# Patient Record
Sex: MALE | Race: WHITE | Employment: OTHER | URBAN - METROPOLITAN AREA
[De-identification: names, ages, dates, MRNs, and addresses within clinical notes are randomized per-mention and may not be internally consistent; named-entity substitution may affect disease eponyms.]

---

## 2017-01-19 ENCOUNTER — TELEPHONE (OUTPATIENT)
Dept: FAMILY MEDICINE CLINIC | Age: 72
End: 2017-01-19

## 2017-07-31 RX ORDER — MINOXIDIL 2.5 MG/1
2.5 TABLET ORAL DAILY
Qty: 90 TABLET | Refills: 3 | Status: SHIPPED | OUTPATIENT
Start: 2017-07-31 | End: 2018-09-04 | Stop reason: SDUPTHER

## 2017-07-31 RX ORDER — LABETALOL 200 MG/1
200 TABLET, FILM COATED ORAL DAILY
Qty: 90 TABLET | Refills: 3 | Status: SHIPPED | OUTPATIENT
Start: 2017-07-31 | End: 2018-09-04 | Stop reason: SDUPTHER

## 2017-07-31 RX ORDER — AMLODIPINE BESYLATE 5 MG/1
5 TABLET ORAL DAILY
Qty: 90 TABLET | Refills: 3 | Status: SHIPPED | OUTPATIENT
Start: 2017-07-31 | End: 2018-09-04 | Stop reason: SDUPTHER

## 2017-07-31 RX ORDER — ALLOPURINOL 300 MG/1
300 TABLET ORAL DAILY
Qty: 90 TABLET | Refills: 3 | Status: SHIPPED | OUTPATIENT
Start: 2017-07-31 | End: 2018-09-04

## 2017-07-31 RX ORDER — GLIMEPIRIDE 2 MG/1
2 TABLET ORAL DAILY
Qty: 90 TABLET | Refills: 3 | Status: SHIPPED | OUTPATIENT
Start: 2017-07-31 | End: 2018-09-04 | Stop reason: SDUPTHER

## 2017-07-31 RX ORDER — LISINOPRIL 10 MG/1
10 TABLET ORAL DAILY
Qty: 90 TABLET | Refills: 3 | Status: SHIPPED | OUTPATIENT
Start: 2017-07-31 | End: 2018-09-04 | Stop reason: SDUPTHER

## 2017-08-17 ENCOUNTER — OFFICE VISIT (OUTPATIENT)
Dept: FAMILY MEDICINE CLINIC | Age: 72
End: 2017-08-17

## 2017-08-17 VITALS
SYSTOLIC BLOOD PRESSURE: 160 MMHG | RESPIRATION RATE: 22 BRPM | OXYGEN SATURATION: 96 % | BODY MASS INDEX: 49.44 KG/M2 | DIASTOLIC BLOOD PRESSURE: 80 MMHG | HEIGHT: 67 IN | HEART RATE: 88 BPM | TEMPERATURE: 98.4 F | WEIGHT: 315 LBS

## 2017-08-17 DIAGNOSIS — M75.52 BURSITIS OF LEFT SHOULDER: ICD-10-CM

## 2017-08-17 DIAGNOSIS — Z23 NEED FOR PNEUMOCOCCAL VACCINE: ICD-10-CM

## 2017-08-17 DIAGNOSIS — I10 ESSENTIAL HYPERTENSION: Primary | ICD-10-CM

## 2017-08-17 DIAGNOSIS — Z12.11 COLON CANCER SCREENING: ICD-10-CM

## 2017-08-17 DIAGNOSIS — E66.01 MORBID OBESITY WITH BMI OF 50.0-59.9, ADULT (HCC): ICD-10-CM

## 2017-08-17 DIAGNOSIS — I63.9 CEREBROVASCULAR ACCIDENT (CVA), UNSPECIFIED MECHANISM (HCC): ICD-10-CM

## 2017-08-17 PROCEDURE — 99214 OFFICE O/P EST MOD 30 MIN: CPT | Performed by: FAMILY MEDICINE

## 2017-08-17 PROCEDURE — 90732 PPSV23 VACC 2 YRS+ SUBQ/IM: CPT | Performed by: FAMILY MEDICINE

## 2017-08-17 PROCEDURE — G8427 DOCREV CUR MEDS BY ELIG CLIN: HCPCS | Performed by: FAMILY MEDICINE

## 2017-08-17 PROCEDURE — G0009 ADMIN PNEUMOCOCCAL VACCINE: HCPCS | Performed by: FAMILY MEDICINE

## 2017-08-17 PROCEDURE — 4004F PT TOBACCO SCREEN RCVD TLK: CPT | Performed by: FAMILY MEDICINE

## 2017-08-17 PROCEDURE — G8598 ASA/ANTIPLAT THER USED: HCPCS | Performed by: FAMILY MEDICINE

## 2017-08-17 PROCEDURE — 4040F PNEUMOC VAC/ADMIN/RCVD: CPT | Performed by: FAMILY MEDICINE

## 2017-08-17 PROCEDURE — 3017F COLORECTAL CA SCREEN DOC REV: CPT | Performed by: FAMILY MEDICINE

## 2017-08-17 PROCEDURE — 1123F ACP DISCUSS/DSCN MKR DOCD: CPT | Performed by: FAMILY MEDICINE

## 2017-08-17 PROCEDURE — G8417 CALC BMI ABV UP PARAM F/U: HCPCS | Performed by: FAMILY MEDICINE

## 2017-08-17 RX ORDER — ATORVASTATIN CALCIUM 40 MG/1
40 TABLET, FILM COATED ORAL DAILY
Qty: 90 TABLET | Refills: 3 | Status: SHIPPED | OUTPATIENT
Start: 2017-08-17 | End: 2018-09-04

## 2017-08-17 ASSESSMENT — ENCOUNTER SYMPTOMS
SHORTNESS OF BREATH: 0
GASTROINTESTINAL NEGATIVE: 1
ALLERGIC/IMMUNOLOGIC NEGATIVE: 1
RESPIRATORY NEGATIVE: 1
EYES NEGATIVE: 1

## 2017-08-22 ENCOUNTER — HOSPITAL ENCOUNTER (OUTPATIENT)
Dept: PHYSICAL THERAPY | Age: 72
Setting detail: THERAPIES SERIES
Discharge: HOME | End: 2017-08-22
Payer: MEDICARE

## 2017-08-22 PROCEDURE — 97110 THERAPEUTIC EXERCISES: CPT

## 2017-08-22 PROCEDURE — G8984 CARRY CURRENT STATUS: HCPCS

## 2017-08-22 PROCEDURE — G8985 CARRY GOAL STATUS: HCPCS

## 2017-08-22 PROCEDURE — 97162 PT EVAL MOD COMPLEX 30 MIN: CPT

## 2017-08-22 ASSESSMENT — PAIN DESCRIPTION - PAIN TYPE: TYPE: ACUTE PAIN

## 2017-08-22 ASSESSMENT — PAIN DESCRIPTION - DESCRIPTORS: DESCRIPTORS: SHARP

## 2017-08-22 ASSESSMENT — PAIN DESCRIPTION - ORIENTATION: ORIENTATION: LEFT;ANTERIOR

## 2017-08-22 ASSESSMENT — PAIN DESCRIPTION - LOCATION: LOCATION: SHOULDER

## 2017-08-22 ASSESSMENT — PAIN DESCRIPTION - PROGRESSION: CLINICAL_PROGRESSION: GRADUALLY IMPROVING

## 2017-08-23 ENCOUNTER — TELEPHONE (OUTPATIENT)
Dept: FAMILY MEDICINE CLINIC | Age: 72
End: 2017-08-23

## 2017-08-23 DIAGNOSIS — R27.0 ATAXIA: Primary | ICD-10-CM

## 2017-08-25 ENCOUNTER — HOSPITAL ENCOUNTER (OUTPATIENT)
Dept: PHYSICAL THERAPY | Age: 72
Setting detail: THERAPIES SERIES
Discharge: HOME | End: 2017-08-25
Payer: MEDICARE

## 2017-08-25 PROCEDURE — 97110 THERAPEUTIC EXERCISES: CPT

## 2017-08-30 ENCOUNTER — HOSPITAL ENCOUNTER (OUTPATIENT)
Dept: PHYSICAL THERAPY | Age: 72
Setting detail: THERAPIES SERIES
Discharge: HOME | End: 2017-08-30
Payer: MEDICARE

## 2017-08-30 PROCEDURE — 97110 THERAPEUTIC EXERCISES: CPT

## 2017-08-30 PROCEDURE — 97164 PT RE-EVAL EST PLAN CARE: CPT

## 2017-08-30 ASSESSMENT — PAIN SCALES - GENERAL: PAINLEVEL_OUTOF10: 4

## 2017-08-30 ASSESSMENT — PAIN DESCRIPTION - ORIENTATION: ORIENTATION: LEFT;ANTERIOR

## 2017-08-30 ASSESSMENT — PAIN DESCRIPTION - DESCRIPTORS: DESCRIPTORS: ACHING

## 2017-08-30 ASSESSMENT — PAIN DESCRIPTION - PAIN TYPE: TYPE: ACUTE PAIN

## 2017-08-30 ASSESSMENT — PAIN DESCRIPTION - LOCATION: LOCATION: SHOULDER

## 2017-09-01 ENCOUNTER — APPOINTMENT (OUTPATIENT)
Dept: PHYSICAL THERAPY | Age: 72
End: 2017-09-01
Payer: MEDICARE

## 2017-09-05 ENCOUNTER — HOSPITAL ENCOUNTER (OUTPATIENT)
Dept: PHYSICAL THERAPY | Age: 72
Setting detail: THERAPIES SERIES
Discharge: HOME | End: 2017-09-05
Payer: MEDICARE

## 2017-09-05 ENCOUNTER — APPOINTMENT (OUTPATIENT)
Dept: PHYSICAL THERAPY | Age: 72
End: 2017-09-05
Payer: MEDICARE

## 2017-09-05 PROCEDURE — 97112 NEUROMUSCULAR REEDUCATION: CPT

## 2017-09-05 PROCEDURE — 97110 THERAPEUTIC EXERCISES: CPT

## 2017-09-05 ASSESSMENT — PAIN SCALES - GENERAL: PAINLEVEL_OUTOF10: 5

## 2017-09-05 ASSESSMENT — PAIN DESCRIPTION - LOCATION: LOCATION: SHOULDER

## 2017-09-05 ASSESSMENT — PAIN DESCRIPTION - PROGRESSION: CLINICAL_PROGRESSION: GRADUALLY IMPROVING

## 2017-09-08 ENCOUNTER — APPOINTMENT (OUTPATIENT)
Dept: PHYSICAL THERAPY | Age: 72
End: 2017-09-08
Payer: MEDICARE

## 2017-09-11 ENCOUNTER — HOSPITAL ENCOUNTER (OUTPATIENT)
Dept: PHYSICAL THERAPY | Age: 72
Setting detail: THERAPIES SERIES
Discharge: HOME | End: 2017-09-11
Payer: MEDICARE

## 2017-09-11 PROCEDURE — 97110 THERAPEUTIC EXERCISES: CPT

## 2017-09-11 PROCEDURE — 97112 NEUROMUSCULAR REEDUCATION: CPT

## 2017-09-11 ASSESSMENT — PAIN DESCRIPTION - ORIENTATION: ORIENTATION: LEFT

## 2017-09-11 ASSESSMENT — PAIN SCALES - GENERAL: PAINLEVEL_OUTOF10: 4

## 2017-09-11 ASSESSMENT — PAIN DESCRIPTION - PAIN TYPE: TYPE: ACUTE PAIN

## 2017-09-11 ASSESSMENT — PAIN DESCRIPTION - LOCATION: LOCATION: SHOULDER

## 2017-09-11 ASSESSMENT — PAIN DESCRIPTION - PROGRESSION: CLINICAL_PROGRESSION: GRADUALLY IMPROVING

## 2017-09-11 ASSESSMENT — PAIN DESCRIPTION - DESCRIPTORS: DESCRIPTORS: ACHING

## 2017-09-13 ENCOUNTER — HOSPITAL ENCOUNTER (OUTPATIENT)
Dept: PHYSICAL THERAPY | Age: 72
Setting detail: THERAPIES SERIES
Discharge: HOME | End: 2017-09-13
Payer: MEDICARE

## 2017-09-13 PROCEDURE — 97112 NEUROMUSCULAR REEDUCATION: CPT

## 2017-09-13 PROCEDURE — 97110 THERAPEUTIC EXERCISES: CPT

## 2017-09-13 ASSESSMENT — PAIN DESCRIPTION - LOCATION: LOCATION: SHOULDER

## 2017-09-13 ASSESSMENT — PAIN DESCRIPTION - ORIENTATION: ORIENTATION: LEFT

## 2017-09-13 ASSESSMENT — PAIN SCALES - GENERAL: PAINLEVEL_OUTOF10: 3

## 2017-09-13 ASSESSMENT — PAIN DESCRIPTION - PROGRESSION: CLINICAL_PROGRESSION: GRADUALLY IMPROVING

## 2017-09-15 ENCOUNTER — HOSPITAL ENCOUNTER (OUTPATIENT)
Dept: PHYSICAL THERAPY | Age: 72
Setting detail: THERAPIES SERIES
Discharge: HOME | End: 2017-09-15
Payer: MEDICARE

## 2017-09-15 PROCEDURE — G8986 CARRY D/C STATUS: HCPCS

## 2017-09-15 PROCEDURE — 97116 GAIT TRAINING THERAPY: CPT

## 2017-09-15 PROCEDURE — 97110 THERAPEUTIC EXERCISES: CPT

## 2017-09-15 PROCEDURE — G8985 CARRY GOAL STATUS: HCPCS

## 2017-09-15 PROCEDURE — 97112 NEUROMUSCULAR REEDUCATION: CPT

## 2017-09-15 ASSESSMENT — PAIN DESCRIPTION - LOCATION: LOCATION: SHOULDER

## 2017-09-15 ASSESSMENT — PAIN DESCRIPTION - ORIENTATION: ORIENTATION: LEFT

## 2017-09-15 ASSESSMENT — PAIN SCALES - GENERAL: PAINLEVEL_OUTOF10: 3

## 2017-12-22 ENCOUNTER — TELEPHONE (OUTPATIENT)
Dept: FAMILY MEDICINE CLINIC | Age: 72
End: 2017-12-22

## 2018-09-04 ENCOUNTER — OFFICE VISIT (OUTPATIENT)
Dept: INTERNAL MEDICINE CLINIC | Age: 73
End: 2018-09-04
Payer: MEDICARE

## 2018-09-04 VITALS
BODY MASS INDEX: 46.65 KG/M2 | TEMPERATURE: 98.2 F | SYSTOLIC BLOOD PRESSURE: 170 MMHG | RESPIRATION RATE: 17 BRPM | HEIGHT: 69 IN | DIASTOLIC BLOOD PRESSURE: 96 MMHG | OXYGEN SATURATION: 97 % | HEART RATE: 89 BPM | WEIGHT: 315 LBS

## 2018-09-04 DIAGNOSIS — E66.01 MORBID OBESITY WITH BMI OF 50.0-59.9, ADULT (HCC): ICD-10-CM

## 2018-09-04 DIAGNOSIS — E78.5 HYPERLIPIDEMIA, UNSPECIFIED HYPERLIPIDEMIA TYPE: ICD-10-CM

## 2018-09-04 DIAGNOSIS — I63.9 CEREBROVASCULAR ACCIDENT (CVA), UNSPECIFIED MECHANISM (HCC): ICD-10-CM

## 2018-09-04 DIAGNOSIS — E11.8 TYPE 2 DIABETES MELLITUS WITH COMPLICATION, WITHOUT LONG-TERM CURRENT USE OF INSULIN (HCC): ICD-10-CM

## 2018-09-04 DIAGNOSIS — Z12.5 PROSTATE CANCER SCREENING: ICD-10-CM

## 2018-09-04 DIAGNOSIS — I10 ESSENTIAL HYPERTENSION: Primary | ICD-10-CM

## 2018-09-04 PROCEDURE — 2022F DILAT RTA XM EVC RTNOPTHY: CPT | Performed by: FAMILY MEDICINE

## 2018-09-04 PROCEDURE — 1123F ACP DISCUSS/DSCN MKR DOCD: CPT | Performed by: FAMILY MEDICINE

## 2018-09-04 PROCEDURE — G8598 ASA/ANTIPLAT THER USED: HCPCS | Performed by: FAMILY MEDICINE

## 2018-09-04 PROCEDURE — 1036F TOBACCO NON-USER: CPT | Performed by: FAMILY MEDICINE

## 2018-09-04 PROCEDURE — 1101F PT FALLS ASSESS-DOCD LE1/YR: CPT | Performed by: FAMILY MEDICINE

## 2018-09-04 PROCEDURE — 99214 OFFICE O/P EST MOD 30 MIN: CPT | Performed by: FAMILY MEDICINE

## 2018-09-04 PROCEDURE — G8510 SCR DEP NEG, NO PLAN REQD: HCPCS | Performed by: FAMILY MEDICINE

## 2018-09-04 PROCEDURE — 4040F PNEUMOC VAC/ADMIN/RCVD: CPT | Performed by: FAMILY MEDICINE

## 2018-09-04 PROCEDURE — G8427 DOCREV CUR MEDS BY ELIG CLIN: HCPCS | Performed by: FAMILY MEDICINE

## 2018-09-04 PROCEDURE — 3046F HEMOGLOBIN A1C LEVEL >9.0%: CPT | Performed by: FAMILY MEDICINE

## 2018-09-04 PROCEDURE — 3017F COLORECTAL CA SCREEN DOC REV: CPT | Performed by: FAMILY MEDICINE

## 2018-09-04 PROCEDURE — G8417 CALC BMI ABV UP PARAM F/U: HCPCS | Performed by: FAMILY MEDICINE

## 2018-09-04 RX ORDER — TETRACYCLINE HYDROCHLORIDE 500 MG/1
500 CAPSULE ORAL 4 TIMES DAILY
COMMUNITY
End: 2019-09-06 | Stop reason: ALTCHOICE

## 2018-09-04 RX ORDER — LABETALOL 200 MG/1
200 TABLET, FILM COATED ORAL DAILY
Qty: 90 TABLET | Refills: 3 | Status: SHIPPED | OUTPATIENT
Start: 2018-09-04 | End: 2019-09-06 | Stop reason: SDUPTHER

## 2018-09-04 RX ORDER — LISINOPRIL 10 MG/1
10 TABLET ORAL DAILY
Qty: 90 TABLET | Refills: 3 | Status: SHIPPED | OUTPATIENT
Start: 2018-09-04 | End: 2019-09-06 | Stop reason: SDUPTHER

## 2018-09-04 RX ORDER — MINOXIDIL 2.5 MG/1
2.5 TABLET ORAL DAILY
Qty: 90 TABLET | Refills: 3 | Status: SHIPPED | OUTPATIENT
Start: 2018-09-04 | End: 2019-09-06 | Stop reason: SDUPTHER

## 2018-09-04 RX ORDER — GLIMEPIRIDE 2 MG/1
2 TABLET ORAL DAILY
Qty: 90 TABLET | Refills: 3 | Status: SHIPPED | OUTPATIENT
Start: 2018-09-04 | End: 2019-09-06 | Stop reason: SDUPTHER

## 2018-09-04 RX ORDER — AMLODIPINE BESYLATE 5 MG/1
5 TABLET ORAL DAILY
Qty: 90 TABLET | Refills: 3 | Status: SHIPPED | OUTPATIENT
Start: 2018-09-04 | End: 2019-09-06 | Stop reason: SDUPTHER

## 2018-09-04 RX ORDER — LISINOPRIL 10 MG/1
TABLET ORAL
COMMUNITY
End: 2018-09-04 | Stop reason: SDUPTHER

## 2018-09-04 RX ORDER — MOMETASONE FUROATE 1 MG/G
CREAM TOPICAL
Qty: 45 G | Refills: 2 | Status: SHIPPED | OUTPATIENT
Start: 2018-09-04 | End: 2019-09-06 | Stop reason: ALTCHOICE

## 2018-09-04 ASSESSMENT — PATIENT HEALTH QUESTIONNAIRE - PHQ9
2. FEELING DOWN, DEPRESSED OR HOPELESS: 0
SUM OF ALL RESPONSES TO PHQ9 QUESTIONS 1 & 2: 0
1. LITTLE INTEREST OR PLEASURE IN DOING THINGS: 0
SUM OF ALL RESPONSES TO PHQ QUESTIONS 1-9: 0
SUM OF ALL RESPONSES TO PHQ QUESTIONS 1-9: 0

## 2018-09-04 ASSESSMENT — ENCOUNTER SYMPTOMS
GASTROINTESTINAL NEGATIVE: 1
RESPIRATORY NEGATIVE: 1
ALLERGIC/IMMUNOLOGIC NEGATIVE: 1
SHORTNESS OF BREATH: 0
EYES NEGATIVE: 1

## 2018-09-04 NOTE — PROGRESS NOTES
Patient is seen in follow up for   Chief Complaint   Patient presents with    Hypertension     Patient presents here for a HTN yearly check up.  Health Maintenance     Patient declines HM. Hypertension   This is a chronic problem. The current episode started more than 1 year ago. The problem is unchanged. The problem is controlled. Pertinent negatives include no chest pain, palpitations or shortness of breath. There are no associated agents to hypertension. Risk factors for coronary artery disease include diabetes mellitus and obesity. Past treatments include ACE inhibitors and calcium channel blockers. The current treatment provides moderate improvement. There are no compliance problems. Past Medical History:   Diagnosis Date    [facility]A (cerebral vascular accident) ([facility] [address]     Diverticulitis     Hyperlipidemia     Hypertension      Patient Active Problem List    Diagnosis Date Noted    Hypertension     Hyperlipidemia     Diverticulitis     CVA (cerebral vascular accident) ([facility] [address]      No past surgical history on file.   Family History   Problem Relation Age of Onset    Heart Disease Mother     Heart Disease Father     Diabetes Sister     High Blood Pressure Sister      Social History     Social History    Marital status:      Spouse name: N/A    Number of children: N/A    Years of education: N/A     Social History Main Topics    Smoking status: Former Smoker     Packs/day: 0.20     Years: 5.00     Types: Cigars     Quit date: 1/4/2018    Smokeless tobacco: Never Used    Alcohol use Yes      Comment: social    Drug use: No    Sexual activity: Not Asked     Other Topics Concern    None     Social History Narrative    None     Current Outpatient Prescriptions   Medication Sig Dispense Refill    tetracycline (ACHROMYCIN;SUMYCIN) 500 MG capsule Take 500 mg by mouth 4 times daily      minoxidil (LONITEN) 2.5 MG tablet Take 1 tablet by mouth daily 90 tablet 3    lisinopril (ZESTRIL) (NORVASC) 5 MG tablet    aspirin 81 MG tablet          Plan  Orders Placed This Encounter   Procedures    CBC Auto Differential     Standing Status:   Future     Standing Expiration Date:   9/4/2019    Comprehensive Metabolic Panel     Standing Status:   Future     Standing Expiration Date:   9/4/2019    Lipid Panel     Standing Status:   Future     Standing Expiration Date:   9/4/2019     Order Specific Question:   Is Patient Fasting?/# of Hours     Answer:   8    Psa screening     Standing Status:   Future     Standing Expiration Date:   9/4/2019    Microalbumin / Creatinine Urine Ratio     Standing Status:   Future     Standing Expiration Date:   9/4/2019     Orders Placed This Encounter   Medications    minoxidil (LONITEN) 2.5 MG tablet     Sig: Take 1 tablet by mouth daily     Dispense:  90 tablet     Refill:  3    lisinopril (ZESTRIL) 10 MG tablet     Sig: Take 1 tablet by mouth daily     Dispense:  90 tablet     Refill:  3    labetalol (NORMODYNE) 200 MG tablet     Sig: Take 1 tablet by mouth daily     Dispense:  90 tablet     Refill:  3    glimepiride (AMARYL) 2 MG tablet     Sig: Take 1 tablet by mouth daily     Dispense:  90 tablet     Refill:  3    amLODIPine (NORVASC) 5 MG tablet     Sig: Take 1 tablet by mouth daily     Dispense:  90 tablet     Refill:  3    aspirin 81 MG tablet     Sig: Take 1 tablet by mouth daily     Dispense:  30 tablet     Refill:  0    mometasone (ELOCON) 0.1 % cream     Sig: Apply topically daily. Dispense:  45 g     Refill:  2     No Follow-up on file.   Kat Meyers MD

## 2018-09-05 RX ORDER — ALLOPURINOL 300 MG/1
300 TABLET ORAL DAILY
Qty: 90 TABLET | Refills: 3 | Status: SHIPPED | OUTPATIENT
Start: 2018-09-05 | End: 2019-09-06 | Stop reason: SDUPTHER

## 2018-09-13 DIAGNOSIS — I10 ESSENTIAL HYPERTENSION: ICD-10-CM

## 2018-09-13 DIAGNOSIS — Z12.5 PROSTATE CANCER SCREENING: ICD-10-CM

## 2018-09-13 DIAGNOSIS — E11.8 TYPE 2 DIABETES MELLITUS WITH COMPLICATION, WITHOUT LONG-TERM CURRENT USE OF INSULIN (HCC): ICD-10-CM

## 2018-09-13 LAB
ALBUMIN SERPL-MCNC: 4 G/DL (ref 3.9–4.9)
ALP BLD-CCNC: 96 U/L (ref 35–104)
ALT SERPL-CCNC: 12 U/L (ref 0–41)
ANION GAP SERPL CALCULATED.3IONS-SCNC: 14 MEQ/L (ref 7–13)
AST SERPL-CCNC: 14 U/L (ref 0–40)
BASOPHILS ABSOLUTE: 0.1 K/UL (ref 0–0.2)
BASOPHILS RELATIVE PERCENT: 0.8 %
BILIRUB SERPL-MCNC: 0.3 MG/DL (ref 0–1.2)
BUN BLDV-MCNC: 33 MG/DL (ref 8–23)
CALCIUM SERPL-MCNC: 9.7 MG/DL (ref 8.6–10.2)
CHLORIDE BLD-SCNC: 107 MEQ/L (ref 98–107)
CHOLESTEROL, TOTAL: 159 MG/DL (ref 0–199)
CO2: 21 MEQ/L (ref 22–29)
CREAT SERPL-MCNC: 1.54 MG/DL (ref 0.7–1.2)
CREATININE URINE: 60.5 MG/DL
EOSINOPHILS ABSOLUTE: 0.1 K/UL (ref 0–0.7)
EOSINOPHILS RELATIVE PERCENT: 1.6 %
GFR AFRICAN AMERICAN: 53.9
GFR NON-AFRICAN AMERICAN: 44.6
GLOBULIN: 3 G/DL (ref 2.3–3.5)
GLUCOSE BLD-MCNC: 131 MG/DL (ref 74–109)
HCT VFR BLD CALC: 35.3 % (ref 42–52)
HDLC SERPL-MCNC: 28 MG/DL (ref 40–59)
HEMOGLOBIN: 11.5 G/DL (ref 14–18)
LDL CHOLESTEROL CALCULATED: 103 MG/DL (ref 0–129)
LYMPHOCYTES ABSOLUTE: 1 K/UL (ref 1–4.8)
LYMPHOCYTES RELATIVE PERCENT: 13.3 %
MCH RBC QN AUTO: 28.9 PG (ref 27–31.3)
MCHC RBC AUTO-ENTMCNC: 32.7 % (ref 33–37)
MCV RBC AUTO: 88.6 FL (ref 80–100)
MICROALBUMIN UR-MCNC: 181.7 MG/DL
MICROALBUMIN/CREAT UR-RTO: 3003.3 MG/G (ref 0–30)
MONOCYTES ABSOLUTE: 0.4 K/UL (ref 0.2–0.8)
MONOCYTES RELATIVE PERCENT: 5.6 %
NEUTROPHILS ABSOLUTE: 5.8 K/UL (ref 1.4–6.5)
NEUTROPHILS RELATIVE PERCENT: 78.7 %
PDW BLD-RTO: 15 % (ref 11.5–14.5)
PLATELET # BLD: 196 K/UL (ref 130–400)
POTASSIUM SERPL-SCNC: 5.9 MEQ/L (ref 3.5–5.1)
PROSTATE SPECIFIC ANTIGEN: 1.94 NG/ML (ref 0–6.22)
RBC # BLD: 3.98 M/UL (ref 4.7–6.1)
SODIUM BLD-SCNC: 142 MEQ/L (ref 132–144)
TOTAL PROTEIN: 7 G/DL (ref 6.4–8.1)
TRIGL SERPL-MCNC: 142 MG/DL (ref 0–200)
WBC # BLD: 7.4 K/UL (ref 4.8–10.8)

## 2019-09-06 ENCOUNTER — OFFICE VISIT (OUTPATIENT)
Dept: FAMILY MEDICINE CLINIC | Age: 74
End: 2019-09-06
Payer: MEDICARE

## 2019-09-06 VITALS
HEART RATE: 101 BPM | OXYGEN SATURATION: 98 % | TEMPERATURE: 98.4 F | SYSTOLIC BLOOD PRESSURE: 132 MMHG | WEIGHT: 315 LBS | RESPIRATION RATE: 22 BRPM | BODY MASS INDEX: 46.65 KG/M2 | HEIGHT: 69 IN | DIASTOLIC BLOOD PRESSURE: 88 MMHG

## 2019-09-06 DIAGNOSIS — I10 ESSENTIAL HYPERTENSION: ICD-10-CM

## 2019-09-06 DIAGNOSIS — Z12.5 SCREENING FOR PROSTATE CANCER: ICD-10-CM

## 2019-09-06 DIAGNOSIS — E66.01 MORBID OBESITY WITH BMI OF 50.0-59.9, ADULT (HCC): ICD-10-CM

## 2019-09-06 DIAGNOSIS — E11.9 DIET-CONTROLLED DIABETES MELLITUS (HCC): Primary | ICD-10-CM

## 2019-09-06 DIAGNOSIS — E78.5 HYPERLIPIDEMIA, UNSPECIFIED HYPERLIPIDEMIA TYPE: ICD-10-CM

## 2019-09-06 DIAGNOSIS — E66.01 MORBID OBESITY (HCC): ICD-10-CM

## 2019-09-06 LAB
ALBUMIN SERPL-MCNC: 3.9 G/DL (ref 3.5–4.6)
ALP BLD-CCNC: 111 U/L (ref 35–104)
ALT SERPL-CCNC: 10 U/L (ref 0–41)
ANION GAP SERPL CALCULATED.3IONS-SCNC: 11 MEQ/L (ref 9–15)
AST SERPL-CCNC: 15 U/L (ref 0–40)
BASOPHILS ABSOLUTE: 0.1 K/UL (ref 0–0.2)
BASOPHILS RELATIVE PERCENT: 1 %
BILIRUB SERPL-MCNC: 0.3 MG/DL (ref 0.2–0.7)
BUN BLDV-MCNC: 35 MG/DL (ref 8–23)
CALCIUM SERPL-MCNC: 9.4 MG/DL (ref 8.5–9.9)
CHLORIDE BLD-SCNC: 108 MEQ/L (ref 95–107)
CHOLESTEROL, FASTING: 150 MG/DL (ref 0–199)
CO2: 21 MEQ/L (ref 20–31)
CREAT SERPL-MCNC: 2.07 MG/DL (ref 0.7–1.2)
EOSINOPHILS ABSOLUTE: 0.2 K/UL (ref 0–0.7)
EOSINOPHILS RELATIVE PERCENT: 2.6 %
GFR AFRICAN AMERICAN: 38.2
GFR NON-AFRICAN AMERICAN: 31.6
GLOBULIN: 3.5 G/DL (ref 2.3–3.5)
GLUCOSE BLD-MCNC: 91 MG/DL (ref 70–99)
HBA1C MFR BLD: 5.5 %
HCT VFR BLD CALC: 33.6 % (ref 42–52)
HDLC SERPL-MCNC: 26 MG/DL (ref 40–59)
HEMOGLOBIN: 11 G/DL (ref 14–18)
LDL CHOLESTEROL CALCULATED: 90 MG/DL (ref 0–129)
LYMPHOCYTES ABSOLUTE: 0.9 K/UL (ref 1–4.8)
LYMPHOCYTES RELATIVE PERCENT: 15.1 %
MCH RBC QN AUTO: 29.4 PG (ref 27–31.3)
MCHC RBC AUTO-ENTMCNC: 32.9 % (ref 33–37)
MCV RBC AUTO: 89.3 FL (ref 80–100)
MONOCYTES ABSOLUTE: 0.4 K/UL (ref 0.2–0.8)
MONOCYTES RELATIVE PERCENT: 6.8 %
NEUTROPHILS ABSOLUTE: 4.5 K/UL (ref 1.4–6.5)
NEUTROPHILS RELATIVE PERCENT: 74.5 %
PDW BLD-RTO: 15.5 % (ref 11.5–14.5)
PLATELET # BLD: 190 K/UL (ref 130–400)
POTASSIUM SERPL-SCNC: 5.7 MEQ/L (ref 3.4–4.9)
PROSTATE SPECIFIC ANTIGEN: 2.48 NG/ML (ref 0–6.22)
RBC # BLD: 3.76 M/UL (ref 4.7–6.1)
SODIUM BLD-SCNC: 140 MEQ/L (ref 135–144)
TOTAL PROTEIN: 7.4 G/DL (ref 6.3–8)
TRIGLYCERIDE, FASTING: 172 MG/DL (ref 0–150)
WBC # BLD: 6.1 K/UL (ref 4.8–10.8)

## 2019-09-06 PROCEDURE — 2022F DILAT RTA XM EVC RTNOPTHY: CPT | Performed by: FAMILY MEDICINE

## 2019-09-06 PROCEDURE — G8427 DOCREV CUR MEDS BY ELIG CLIN: HCPCS | Performed by: FAMILY MEDICINE

## 2019-09-06 PROCEDURE — 3017F COLORECTAL CA SCREEN DOC REV: CPT | Performed by: FAMILY MEDICINE

## 2019-09-06 PROCEDURE — 4040F PNEUMOC VAC/ADMIN/RCVD: CPT | Performed by: FAMILY MEDICINE

## 2019-09-06 PROCEDURE — G8417 CALC BMI ABV UP PARAM F/U: HCPCS | Performed by: FAMILY MEDICINE

## 2019-09-06 PROCEDURE — 1123F ACP DISCUSS/DSCN MKR DOCD: CPT | Performed by: FAMILY MEDICINE

## 2019-09-06 PROCEDURE — G8598 ASA/ANTIPLAT THER USED: HCPCS | Performed by: FAMILY MEDICINE

## 2019-09-06 PROCEDURE — 83036 HEMOGLOBIN GLYCOSYLATED A1C: CPT | Performed by: FAMILY MEDICINE

## 2019-09-06 PROCEDURE — 3044F HG A1C LEVEL LT 7.0%: CPT | Performed by: FAMILY MEDICINE

## 2019-09-06 PROCEDURE — 99214 OFFICE O/P EST MOD 30 MIN: CPT | Performed by: FAMILY MEDICINE

## 2019-09-06 PROCEDURE — 1036F TOBACCO NON-USER: CPT | Performed by: FAMILY MEDICINE

## 2019-09-06 RX ORDER — GLIMEPIRIDE 2 MG/1
2 TABLET ORAL DAILY
Qty: 90 TABLET | Refills: 3 | Status: SHIPPED | OUTPATIENT
Start: 2019-09-06 | End: 2020-09-09 | Stop reason: SDUPTHER

## 2019-09-06 RX ORDER — LABETALOL 200 MG/1
200 TABLET, FILM COATED ORAL DAILY
Qty: 90 TABLET | Refills: 3 | Status: SHIPPED | OUTPATIENT
Start: 2019-09-06 | End: 2020-09-09 | Stop reason: SDUPTHER

## 2019-09-06 RX ORDER — ALLOPURINOL 300 MG/1
300 TABLET ORAL DAILY
Qty: 90 TABLET | Refills: 3 | Status: SHIPPED | OUTPATIENT
Start: 2019-09-06 | End: 2020-09-09 | Stop reason: SDUPTHER

## 2019-09-06 RX ORDER — LISINOPRIL 10 MG/1
10 TABLET ORAL DAILY
Qty: 90 TABLET | Refills: 3 | Status: SHIPPED | OUTPATIENT
Start: 2019-09-06 | End: 2020-09-09 | Stop reason: SDUPTHER

## 2019-09-06 RX ORDER — AMLODIPINE BESYLATE 5 MG/1
5 TABLET ORAL DAILY
Qty: 90 TABLET | Refills: 3 | Status: SHIPPED | OUTPATIENT
Start: 2019-09-06 | End: 2020-09-09 | Stop reason: SDUPTHER

## 2019-09-06 RX ORDER — MINOXIDIL 2.5 MG/1
2.5 TABLET ORAL DAILY
Qty: 90 TABLET | Refills: 3 | Status: SHIPPED | OUTPATIENT
Start: 2019-09-06 | End: 2020-09-09 | Stop reason: SDUPTHER

## 2019-09-06 ASSESSMENT — PATIENT HEALTH QUESTIONNAIRE - PHQ9
1. LITTLE INTEREST OR PLEASURE IN DOING THINGS: 0
2. FEELING DOWN, DEPRESSED OR HOPELESS: 0
SUM OF ALL RESPONSES TO PHQ QUESTIONS 1-9: 0
SUM OF ALL RESPONSES TO PHQ9 QUESTIONS 1 & 2: 0
SUM OF ALL RESPONSES TO PHQ QUESTIONS 1-9: 0

## 2019-09-06 ASSESSMENT — ENCOUNTER SYMPTOMS
RESPIRATORY NEGATIVE: 1
GASTROINTESTINAL NEGATIVE: 1
SHORTNESS OF BREATH: 0
ALLERGIC/IMMUNOLOGIC NEGATIVE: 1
EYES NEGATIVE: 1

## 2019-09-06 NOTE — PROGRESS NOTES
per session: None    Stress: None   Relationships    Social connections:     Talks on phone: None     Gets together: None     Attends Religion service: None     Active member of club or organization: None     Attends meetings of clubs or organizations: None     Relationship status: None    Intimate partner violence:     Fear of current or ex partner: None     Emotionally abused: None     Physically abused: None     Forced sexual activity: None   Other Topics Concern    None   Social History Narrative    None     Current Outpatient Medications   Medication Sig Dispense Refill    allopurinol (ZYLOPRIM) 300 MG tablet Take 1 tablet by mouth daily 90 tablet 3    minoxidil (LONITEN) 2.5 MG tablet Take 1 tablet by mouth daily 90 tablet 3    lisinopril (ZESTRIL) 10 MG tablet Take 1 tablet by mouth daily 90 tablet 3    labetalol (NORMODYNE) 200 MG tablet Take 1 tablet by mouth daily 90 tablet 3    glimepiride (AMARYL) 2 MG tablet Take 1 tablet by mouth daily 90 tablet 3    amLODIPine (NORVASC) 5 MG tablet Take 1 tablet by mouth daily 90 tablet 3    aspirin 81 MG tablet Take 1 tablet by mouth daily 90 tablet 3     No current facility-administered medications for this visit. No current outpatient medications on file prior to visit. No current facility-administered medications on file prior to visit.       No Known Allergies  Health Maintenance   Topic Date Due    AAA screen  1945    Hepatitis C screen  1945    DTaP/Tdap/Td vaccine (1 - Tdap) 03/26/1965    Shingles Vaccine (1 of 2) 03/26/1996    Colon cancer screen colonoscopy  03/26/1996    Annual Wellness Visit (AWV)  03/26/2009    A1C test (Diabetic or Prediabetic)  03/27/2016    Flu vaccine (1) 09/01/2019    Potassium monitoring  09/13/2019    Creatinine monitoring  09/13/2019    Lipid screen  09/13/2023    Pneumococcal 65+ years Vaccine  Completed       Review of Systems     Review of Systems   Constitutional: Negative for normal mood and affect. Assessment   Diagnosis Orders   1. Diet-controlled diabetes mellitus (HCC)  POCT glycosylated hemoglobin (Hb A1C)   2. Hyperlipidemia, unspecified hyperlipidemia type  Lipid, Fasting   3. Essential hypertension  Comprehensive Metabolic Panel    CBC Auto Differential   4. Screening for prostate cancer  Psa screening   5.  Morbid obesity ([address]       Problem List     Hypertension    Relevant Medications    minoxidil (LONITEN) 2.5 MG tablet    lisinopril (ZESTRIL) 10 MG tablet    labetalol (NORMODYNE) 200 MG tablet    amLODIPine (NORVASC) 5 MG tablet    aspirin 81 MG tablet    Other Relevant Orders    Comprehensive Metabolic Panel    CBC Auto Differential    Hyperlipidemia    Relevant Medications    minoxidil (LONITEN) 2.5 MG tablet    lisinopril (ZESTRIL) 10 MG tablet    labetalol (NORMODYNE) 200 MG tablet    amLODIPine (NORVASC) 5 MG tablet    aspirin 81 MG tablet    Other Relevant Orders    Lipid, Fasting          Plan  Orders Placed This Encounter   Procedures    Comprehensive Metabolic Panel     Standing Status:   Future     Standing Expiration Date:   9/5/2020    Lipid, Fasting     Standing Status:   Future     Standing Expiration Date:   9/6/2020    Psa screening     Standing Status:   Future     Standing Expiration Date:   9/5/2020    CBC Auto Differential     Standing Status:   Future     Standing Expiration Date:   9/5/2020    POCT glycosylated hemoglobin (Hb A1C)     Orders Placed This Encounter   Medications    allopurinol (ZYLOPRIM) 300 MG tablet     Sig: Take 1 tablet by mouth daily     Dispense:  90 tablet     Refill:  3    minoxidil (LONITEN) 2.5 MG tablet     Sig: Take 1 tablet by mouth daily     Dispense:  90 tablet     Refill:  3    lisinopril (ZESTRIL) 10 MG tablet     Sig: Take 1 tablet by mouth daily     Dispense:  90 tablet     Refill:  3    labetalol (NORMODYNE) 200 MG tablet     Sig: Take 1 tablet by mouth daily     Dispense:  90 tablet     Refill:  3   

## 2019-09-17 ENCOUNTER — OFFICE VISIT (OUTPATIENT)
Dept: FAMILY MEDICINE CLINIC | Age: 74
End: 2019-09-17
Payer: MEDICARE

## 2019-09-17 VITALS
BODY MASS INDEX: 46.65 KG/M2 | DIASTOLIC BLOOD PRESSURE: 98 MMHG | OXYGEN SATURATION: 97 % | WEIGHT: 315 LBS | HEART RATE: 108 BPM | HEIGHT: 69 IN | TEMPERATURE: 98.4 F | SYSTOLIC BLOOD PRESSURE: 184 MMHG | RESPIRATION RATE: 26 BRPM

## 2019-09-17 DIAGNOSIS — N18.30 CHRONIC KIDNEY DISEASE, STAGE III (MODERATE) (HCC): ICD-10-CM

## 2019-09-17 DIAGNOSIS — E66.01 MORBID OBESITY WITH BMI OF 50.0-59.9, ADULT (HCC): ICD-10-CM

## 2019-09-17 DIAGNOSIS — E11.9 DIET-CONTROLLED DIABETES MELLITUS (HCC): ICD-10-CM

## 2019-09-17 DIAGNOSIS — E11.9 DIET-CONTROLLED DIABETES MELLITUS (HCC): Primary | ICD-10-CM

## 2019-09-17 LAB
CREATININE URINE: 60.4 MG/DL
MICROALBUMIN UR-MCNC: 249.6 MG/DL
MICROALBUMIN/CREAT UR-RTO: 4132.5 MG/G (ref 0–30)

## 2019-09-17 PROCEDURE — G8598 ASA/ANTIPLAT THER USED: HCPCS | Performed by: FAMILY MEDICINE

## 2019-09-17 PROCEDURE — 4040F PNEUMOC VAC/ADMIN/RCVD: CPT | Performed by: FAMILY MEDICINE

## 2019-09-17 PROCEDURE — G8417 CALC BMI ABV UP PARAM F/U: HCPCS | Performed by: FAMILY MEDICINE

## 2019-09-17 PROCEDURE — 1036F TOBACCO NON-USER: CPT | Performed by: FAMILY MEDICINE

## 2019-09-17 PROCEDURE — G8427 DOCREV CUR MEDS BY ELIG CLIN: HCPCS | Performed by: FAMILY MEDICINE

## 2019-09-17 PROCEDURE — 3017F COLORECTAL CA SCREEN DOC REV: CPT | Performed by: FAMILY MEDICINE

## 2019-09-17 PROCEDURE — 1123F ACP DISCUSS/DSCN MKR DOCD: CPT | Performed by: FAMILY MEDICINE

## 2019-09-17 PROCEDURE — 2022F DILAT RTA XM EVC RTNOPTHY: CPT | Performed by: FAMILY MEDICINE

## 2019-09-17 PROCEDURE — 3044F HG A1C LEVEL LT 7.0%: CPT | Performed by: FAMILY MEDICINE

## 2019-09-17 PROCEDURE — 99213 OFFICE O/P EST LOW 20 MIN: CPT | Performed by: FAMILY MEDICINE

## 2019-09-17 RX ORDER — MOMETASONE FUROATE 1 MG/G
CREAM TOPICAL
Qty: 50 G | Refills: 3 | Status: SHIPPED | OUTPATIENT
Start: 2019-09-17 | End: 2021-02-16

## 2019-09-17 ASSESSMENT — ENCOUNTER SYMPTOMS
ALLERGIC/IMMUNOLOGIC NEGATIVE: 1
EYES NEGATIVE: 1
GASTROINTESTINAL NEGATIVE: 1
RESPIRATORY NEGATIVE: 1
SHORTNESS OF BREATH: 0

## 2019-09-17 NOTE — PROGRESS NOTES
no tenderness. There is no rebound and no guarding. No hernia. Genitourinary: Penis normal.   Musculoskeletal: Normal range of motion. He exhibits no edema or tenderness. Lymphadenopathy:     He has no cervical adenopathy. Neurological: He is alert and oriented to person, place, and time. No cranial nerve deficit. Coordination normal.   Skin: Skin is warm and dry. Psychiatric: He has a normal mood and affect. Assessment   Diagnosis Orders   1. Diet-controlled diabetes mellitus (HCC)  Microalbumin / Creatinine Urine Ratio   2. Morbid obesity with BMI of 50.0-59.9, adult ([facility] [address]     3. Chronic kidney disease, stage III (moderate) (HCC)  Amb External Referral To Nephrology    Microalbumin / Creatinine Urine Ratio     Problem List     None          Plan  Orders Placed This Encounter   Procedures    Microalbumin / Creatinine Urine Ratio     Standing Status:   Future     Number of Occurrences:   1     Standing Expiration Date:   9/16/2020    Amb External Referral To Nephrology     Referral Priority:   Routine     Referral Type:   Eval and Treat     Referral Reason:   Specialty Services Required     Referred to Provider:   Parag Jon MD     Requested Specialty:   Nephrology     Number of Visits Requested:   1     Orders Placed This Encounter   Medications    mometasone (ELOCON) 0.1 % cream     Sig: Apply topically daily. Dispense:  50 g     Refill:  3     No follow-ups on file.   Virgie Shahid MD

## 2020-05-29 ENCOUNTER — TELEPHONE (OUTPATIENT)
Dept: FAMILY MEDICINE CLINIC | Age: 75
End: 2020-05-29

## 2020-05-29 NOTE — TELEPHONE ENCOUNTER
Teto Maurice was contacted to set up a video visit with Elizabeth Yusuf MD.     Jacquie Moreno with: Left voicemail for patient to call back @ [phone]. He is due for a hypertension follow up.     [address] [place] [address]

## 2020-06-24 ENCOUNTER — NURSE TRIAGE (OUTPATIENT)
Dept: OTHER | Facility: CLINIC | Age: 75
End: 2020-06-24

## 2020-06-24 ENCOUNTER — TELEPHONE (OUTPATIENT)
Dept: ADMINISTRATIVE | Age: 75
End: 2020-06-24

## 2020-06-25 ENCOUNTER — VIRTUAL VISIT (OUTPATIENT)
Dept: FAMILY MEDICINE CLINIC | Age: 75
End: 2020-06-25
Payer: MEDICARE

## 2020-06-25 PROCEDURE — 99442 PR PHYS/QHP TELEPHONE EVALUATION 11-20 MIN: CPT | Performed by: FAMILY MEDICINE

## 2020-06-25 ASSESSMENT — PATIENT HEALTH QUESTIONNAIRE - PHQ9
1. LITTLE INTEREST OR PLEASURE IN DOING THINGS: 0
2. FEELING DOWN, DEPRESSED OR HOPELESS: 0
SUM OF ALL RESPONSES TO PHQ QUESTIONS 1-9: 0
SUM OF ALL RESPONSES TO PHQ QUESTIONS 1-9: 0
SUM OF ALL RESPONSES TO PHQ9 QUESTIONS 1 & 2: 0

## 2020-06-25 NOTE — PROGRESS NOTES
Patient is seen in follow up for   Chief Complaint   Patient presents with    Hypertension    Diabetes     Pts. last A1C was done on 09/06/2019 and was 5.5.  Hyperlipidemia   Tamra Bang is a 77 y.o. male evaluated via telephone on 6/25/2020. Consent:  He and/or health care decision maker is aware that that he may receive a bill for this telephone service, depending on his insurance coverage, and has provided verbal consent to proceed: Yes      Documentation:  I communicated with the patient and/or health care decision maker about see below  . Details of this discussion including any medical advice provided: see below      I affirm this is a Patient Initiated Episode with a Patient who has not had a related appointment within my department in the past 7 days or scheduled within the next 24 hours. Patient identification was verified at the start of the visit: Yes    Total Time: minutes: 11-20 minutes    Note: not billable if this call serves to triage the patient into an appointment for the relevant concern      Kevin Hananh     Hypertension   This is a chronic problem. The current episode started more than 1 year ago. The problem is unchanged. The problem is controlled. Pertinent negatives include no chest pain, palpitations or shortness of breath. There are no associated agents to hypertension. Risk factors for coronary artery disease include diabetes mellitus, male gender and obesity. Past treatments include ACE inhibitors and beta blockers. The current treatment provides moderate improvement. There are no compliance problems. Diabetes   He presents for his follow-up diabetic visit. He has type 2 diabetes mellitus. His disease course has been stable. There are no hypoglycemic associated symptoms. There are no diabetic associated symptoms. Pertinent negatives for diabetes include no chest pain. There are no hypoglycemic complications. Symptoms are stable. There are no diabetic complications. or organizations: Not on file     Relationship status: Not on file    Intimate partner violence     Fear of current or ex partner: Not on file     Emotionally abused: Not on file     Physically abused: Not on file     Forced sexual activity: Not on file   Other Topics Concern    Not on file   Social History Narrative    Not on file     Current Outpatient Medications   Medication Sig Dispense Refill    mometasone (ELOCON) 0.1 % cream Apply topically daily. 50 g 3    allopurinol (ZYLOPRIM) 300 MG tablet Take 1 tablet by mouth daily 90 tablet 3    minoxidil (LONITEN) 2.5 MG tablet Take 1 tablet by mouth daily 90 tablet 3    lisinopril (ZESTRIL) 10 MG tablet Take 1 tablet by mouth daily 90 tablet 3    labetalol (NORMODYNE) 200 MG tablet Take 1 tablet by mouth daily 90 tablet 3    glimepiride (AMARYL) 2 MG tablet Take 1 tablet by mouth daily 90 tablet 3    amLODIPine (NORVASC) 5 MG tablet Take 1 tablet by mouth daily 90 tablet 3    aspirin 81 MG tablet Take 1 tablet by mouth daily 90 tablet 3     No current facility-administered medications for this visit. Current Outpatient Medications on File Prior to Visit   Medication Sig Dispense Refill    mometasone (ELOCON) 0.1 % cream Apply topically daily. 50 g 3    allopurinol (ZYLOPRIM) 300 MG tablet Take 1 tablet by mouth daily 90 tablet 3    minoxidil (LONITEN) 2.5 MG tablet Take 1 tablet by mouth daily 90 tablet 3    lisinopril (ZESTRIL) 10 MG tablet Take 1 tablet by mouth daily 90 tablet 3    labetalol (NORMODYNE) 200 MG tablet Take 1 tablet by mouth daily 90 tablet 3    glimepiride (AMARYL) 2 MG tablet Take 1 tablet by mouth daily 90 tablet 3    amLODIPine (NORVASC) 5 MG tablet Take 1 tablet by mouth daily 90 tablet 3    aspirin 81 MG tablet Take 1 tablet by mouth daily 90 tablet 3     No current facility-administered medications on file prior to visit.       No Known Allergies  Health Maintenance   Topic Date Due    AAA screen  1945   [facility] Differential     Standing Status:   Future     Standing Expiration Date:   6/25/2021    Comprehensive Metabolic Panel     Standing Status:   Future     Standing Expiration Date:   6/25/2021    Lipid Panel     Standing Status:   Future     Standing Expiration Date:   6/25/2021     Order Specific Question:   Is Patient Fasting?/# of Hours     Answer:   8    Psa screening     Standing Status:   Future     Standing Expiration Date:   6/25/2021    Hemoglobin A1C     Standing Status:   Future     Standing Expiration Date:   6/25/2021    Microalbumin / Creatinine Urine Ratio     Standing Status:   Future     Standing Expiration Date:   6/25/2021    Ambulatory referral to Gastroenterology     Referral Priority:   Routine     Referral Type:   Eval and Treat     Referral Reason:   Specialty Services Required     Referred to Provider:   Monie Bourne MD     Requested Specialty:   Gastroenterology     Number of Visits Requested:   1     No orders of the defined types were placed in this encounter. Return in about 6 months (around 12/25/2020), or if symptoms worsen or fail to improve.   Jodi Meza MD

## 2020-07-02 ENCOUNTER — TELEPHONE (OUTPATIENT)
Dept: FAMILY MEDICINE CLINIC | Age: 75
End: 2020-07-02

## 2020-07-23 ENCOUNTER — TELEPHONE (OUTPATIENT)
Dept: FAMILY MEDICINE CLINIC | Age: 75
End: 2020-07-23

## 2020-07-28 ENCOUNTER — TELEPHONE (OUTPATIENT)
Dept: FAMILY MEDICINE CLINIC | Age: 75
End: 2020-07-28

## 2020-07-28 NOTE — TELEPHONE ENCOUNTER
Scheduling outreach call to review Health Maintenance and / or schedule appointment. AWV due  Colonoscopy due  [address] [facility] ?   Lab work CMP-A1c?-microalbumin-Lipid-DM eye & foot exam-Hep C-HIV  Mammogram n/a  PHQ-9 done  Last appointment 6-25-20  Upcoming appointment 9-9-20  Scanned and updated HM yes    Appointment note edited

## 2020-09-09 ENCOUNTER — OFFICE VISIT (OUTPATIENT)
Dept: FAMILY MEDICINE CLINIC | Age: 75
End: 2020-09-09
Payer: MEDICARE

## 2020-09-09 VITALS
SYSTOLIC BLOOD PRESSURE: 136 MMHG | BODY MASS INDEX: 46.65 KG/M2 | HEIGHT: 69 IN | HEART RATE: 71 BPM | TEMPERATURE: 98 F | OXYGEN SATURATION: 96 % | WEIGHT: 315 LBS | DIASTOLIC BLOOD PRESSURE: 88 MMHG

## 2020-09-09 DIAGNOSIS — E11.8 TYPE 2 DIABETES MELLITUS WITH COMPLICATION, WITHOUT LONG-TERM CURRENT USE OF INSULIN (HCC): ICD-10-CM

## 2020-09-09 PROBLEM — E66.01 MORBID OBESITY WITH BMI OF 50.0-59.9, ADULT (HCC): Status: ACTIVE | Noted: 2020-09-09

## 2020-09-09 LAB
CREATININE URINE: 86.6 MG/DL
HBA1C MFR BLD: 5.5 %
MICROALBUMIN UR-MCNC: 262.2 MG/DL
MICROALBUMIN/CREAT UR-RTO: 3027.7 MG/G (ref 0–30)

## 2020-09-09 PROCEDURE — 99214 OFFICE O/P EST MOD 30 MIN: CPT | Performed by: FAMILY MEDICINE

## 2020-09-09 PROCEDURE — 4040F PNEUMOC VAC/ADMIN/RCVD: CPT | Performed by: FAMILY MEDICINE

## 2020-09-09 PROCEDURE — 3017F COLORECTAL CA SCREEN DOC REV: CPT | Performed by: FAMILY MEDICINE

## 2020-09-09 PROCEDURE — 2022F DILAT RTA XM EVC RTNOPTHY: CPT | Performed by: FAMILY MEDICINE

## 2020-09-09 PROCEDURE — 1036F TOBACCO NON-USER: CPT | Performed by: FAMILY MEDICINE

## 2020-09-09 PROCEDURE — G8417 CALC BMI ABV UP PARAM F/U: HCPCS | Performed by: FAMILY MEDICINE

## 2020-09-09 PROCEDURE — 83036 HEMOGLOBIN GLYCOSYLATED A1C: CPT | Performed by: FAMILY MEDICINE

## 2020-09-09 PROCEDURE — 90471 IMMUNIZATION ADMIN: CPT | Performed by: FAMILY MEDICINE

## 2020-09-09 PROCEDURE — 90715 TDAP VACCINE 7 YRS/> IM: CPT | Performed by: FAMILY MEDICINE

## 2020-09-09 PROCEDURE — 1123F ACP DISCUSS/DSCN MKR DOCD: CPT | Performed by: FAMILY MEDICINE

## 2020-09-09 PROCEDURE — G8427 DOCREV CUR MEDS BY ELIG CLIN: HCPCS | Performed by: FAMILY MEDICINE

## 2020-09-09 PROCEDURE — 3044F HG A1C LEVEL LT 7.0%: CPT | Performed by: FAMILY MEDICINE

## 2020-09-09 RX ORDER — GLIMEPIRIDE 2 MG/1
2 TABLET ORAL DAILY
Qty: 90 TABLET | Refills: 3 | Status: SHIPPED | OUTPATIENT
Start: 2020-09-09 | End: 2022-10-07

## 2020-09-09 RX ORDER — LABETALOL 200 MG/1
200 TABLET, FILM COATED ORAL DAILY
Qty: 90 TABLET | Refills: 3 | Status: SHIPPED | OUTPATIENT
Start: 2020-09-09 | End: 2022-10-07

## 2020-09-09 RX ORDER — ALLOPURINOL 300 MG/1
300 TABLET ORAL DAILY
Qty: 90 TABLET | Refills: 3 | Status: SHIPPED | OUTPATIENT
Start: 2020-09-09 | End: 2022-01-04 | Stop reason: SDUPTHER

## 2020-09-09 RX ORDER — MINOXIDIL 2.5 MG/1
2.5 TABLET ORAL DAILY
Qty: 90 TABLET | Refills: 3 | Status: SHIPPED | OUTPATIENT
Start: 2020-09-09 | End: 2022-01-04 | Stop reason: SDUPTHER

## 2020-09-09 RX ORDER — AMLODIPINE BESYLATE 5 MG/1
5 TABLET ORAL DAILY
Qty: 90 TABLET | Refills: 3 | Status: SHIPPED | OUTPATIENT
Start: 2020-09-09 | End: 2020-11-10 | Stop reason: SDUPTHER

## 2020-09-09 RX ORDER — LISINOPRIL 10 MG/1
10 TABLET ORAL DAILY
Qty: 90 TABLET | Refills: 3 | Status: SHIPPED | OUTPATIENT
Start: 2020-09-09 | End: 2022-10-07

## 2020-09-09 ASSESSMENT — ENCOUNTER SYMPTOMS
RESPIRATORY NEGATIVE: 1
SHORTNESS OF BREATH: 0
EYES NEGATIVE: 1
ALLERGIC/IMMUNOLOGIC NEGATIVE: 1
GASTROINTESTINAL NEGATIVE: 1

## 2020-09-09 NOTE — PROGRESS NOTES
Medical: None     Non-medical: None   Tobacco Use    Smoking status: Former Smoker     Packs/day: 0.20     Years: 5.00     Pack years: 1.00     Types: Cigars     Last attempt to quit: 2018     Years since quittin.6    Smokeless tobacco: Never Used   Substance and Sexual Activity    Alcohol use: Yes     Comment: social    Drug use: No    Sexual activity: None   Lifestyle    Physical activity     Days per week: None     Minutes per session: None    Stress: None   Relationships    Social connections     Talks on phone: None     Gets together: None     Attends Congregation service: None     Active member of club or organization: None     Attends meetings of clubs or organizations: None     Relationship status: None    Intimate partner violence     Fear of current or ex partner: None     Emotionally abused: None     Physically abused: None     Forced sexual activity: None   Other Topics Concern    None   Social History Narrative    None     Current Outpatient Medications   Medication Sig Dispense Refill    allopurinol (ZYLOPRIM) 300 MG tablet Take 1 tablet by mouth daily 90 tablet 3    minoxidil (LONITEN) 2.5 MG tablet Take 1 tablet by mouth daily 90 tablet 3    lisinopril (ZESTRIL) 10 MG tablet Take 1 tablet by mouth daily 90 tablet 3    labetalol (NORMODYNE) 200 MG tablet Take 1 tablet by mouth daily 90 tablet 3    glimepiride (AMARYL) 2 MG tablet Take 1 tablet by mouth daily 90 tablet 3    amLODIPine (NORVASC) 5 MG tablet Take 1 tablet by mouth daily 90 tablet 3    mometasone (ELOCON) 0.1 % cream Apply topically daily. 50 g 3    aspirin 81 MG tablet Take 1 tablet by mouth daily 90 tablet 3     No current facility-administered medications for this visit. Current Outpatient Medications on File Prior to Visit   Medication Sig Dispense Refill    mometasone (ELOCON) 0.1 % cream Apply topically daily.  50 g 3    aspirin 81 MG tablet Take 1 tablet by mouth daily 90 tablet 3     No current facility-administered medications on file prior to visit. No Known Allergies  Health Maintenance   Topic Date Due    AAA screen  1945    Hepatitis C screen  1945    Diabetic foot exam  03/26/1956    Diabetic retinal exam  03/26/1956    DTaP/Tdap/Td vaccine (1 - Tdap) 03/26/1965    Shingles Vaccine (1 of 2) 03/26/1996    Colon cancer screen colonoscopy  03/26/1996    Annual Wellness Visit (AWV)  06/20/2019    Flu vaccine (1) 09/01/2020    A1C test (Diabetic or Prediabetic)  09/06/2020    Potassium monitoring  09/06/2020    Creatinine monitoring  09/06/2020    Lipid screen  09/06/2020    Diabetic microalbuminuria test  09/17/2020    Pneumococcal 65+ years Vaccine  Completed    Hepatitis A vaccine  Aged Out    Hib vaccine  Aged Out    Meningococcal (ACWY) vaccine  Aged Out       Review of Systems     Review of Systems   Constitutional: Negative for activity change, appetite change, chills, fever and unexpected weight change. HENT: Negative. Eyes: Negative. Respiratory: Negative. Negative for shortness of breath. Cardiovascular: Negative. Negative for chest pain and palpitations. Gastrointestinal: Negative. Endocrine: Negative. Genitourinary: Negative. Musculoskeletal: Negative. Skin: Negative. Allergic/Immunologic: Negative. Neurological: Negative. Hematological: Negative. Psychiatric/Behavioral: Negative. Physical Exam  Vitals:    09/09/20 1101 09/09/20 1107 09/09/20 1127   BP: (!) 154/82 (!) 164/72 136/88   Site: Left Upper Arm Left Upper Arm    Position: Sitting Sitting    Cuff Size: Large Adult Large Adult    Pulse: 71     Temp: 98 °F (36.7 °C)     TempSrc: Oral     SpO2: 96%     Weight: (!) 340 lb (154.2 kg)     Height: 5' 9\" (1.753 m)         Physical Exam  Constitutional:       Appearance: He is well-developed.    HENT:      Right Ear: External ear normal.      Left Ear: External ear normal.   Eyes:      Conjunctiva/sclera: adult [facility])    Relevant Medications    glimepiride (AMARYL) 2 MG tablet          Plan  Orders Placed This Encounter   Procedures    Tdap (age 6y and older) IM ([address])    CBC Auto Differential     Standing Status:   Future     Standing Expiration Date:   9/9/2021    Comprehensive Metabolic Panel     Standing Status:   Future     Standing Expiration Date:   9/9/2021    Lipid Panel     Standing Status:   Future     Standing Expiration Date:   9/9/2021     Order Specific Question:   Is Patient Fasting?/# of Hours     Answer:   8    Microalbumin / Creatinine Urine Ratio     Standing Status:   Future     Standing Expiration Date:   9/9/2021    Iron And Tibc     Standing Status:   Future     Standing Expiration Date:   9/9/2021     Order Specific Question:   Is Patient Fasting? Answer:   8     Order Specific Question:   No of Hours? Answer:   8    Erythropoietin     Standing Status:   Future     Standing Expiration Date:   9/9/2021    POCT glycosylated hemoglobin (Hb A1C)     Orders Placed This Encounter   Medications    allopurinol (ZYLOPRIM) 300 MG tablet     Sig: Take 1 tablet by mouth daily     Dispense:  90 tablet     Refill:  3    minoxidil (LONITEN) 2.5 MG tablet     Sig: Take 1 tablet by mouth daily     Dispense:  90 tablet     Refill:  3    lisinopril (ZESTRIL) 10 MG tablet     Sig: Take 1 tablet by mouth daily     Dispense:  90 tablet     Refill:  3    labetalol (NORMODYNE) 200 MG tablet     Sig: Take 1 tablet by mouth daily     Dispense:  90 tablet     Refill:  3    glimepiride (AMARYL) 2 MG tablet     Sig: Take 1 tablet by mouth daily     Dispense:  90 tablet     Refill:  3    amLODIPine (NORVASC) 5 MG tablet     Sig: Take 1 tablet by mouth daily     Dispense:  90 tablet     Refill:  3     No follow-ups on file.   Amandeep Waller MD

## 2020-10-02 DIAGNOSIS — N18.30 ANEMIA DUE TO STAGE 3 CHRONIC KIDNEY DISEASE (HCC): ICD-10-CM

## 2020-10-02 DIAGNOSIS — E11.8 TYPE 2 DIABETES MELLITUS WITH COMPLICATION, WITHOUT LONG-TERM CURRENT USE OF INSULIN (HCC): ICD-10-CM

## 2020-10-02 DIAGNOSIS — E11.9 DIET-CONTROLLED DIABETES MELLITUS (HCC): ICD-10-CM

## 2020-10-02 DIAGNOSIS — Z12.5 PROSTATE CANCER SCREENING: ICD-10-CM

## 2020-10-02 DIAGNOSIS — D63.1 ANEMIA DUE TO STAGE 3 CHRONIC KIDNEY DISEASE (HCC): ICD-10-CM

## 2020-10-02 LAB
ALBUMIN SERPL-MCNC: 3.6 G/DL (ref 3.5–4.6)
ALBUMIN SERPL-MCNC: 3.7 G/DL (ref 3.5–4.6)
ALP BLD-CCNC: 102 U/L (ref 35–104)
ALT SERPL-CCNC: 16 U/L (ref 0–41)
ANION GAP SERPL CALCULATED.3IONS-SCNC: 13 MEQ/L (ref 9–15)
ANION GAP SERPL CALCULATED.3IONS-SCNC: 14 MEQ/L (ref 9–15)
AST SERPL-CCNC: 18 U/L (ref 0–40)
BASOPHILS ABSOLUTE: 0 K/UL (ref 0–0.2)
BASOPHILS RELATIVE PERCENT: 0.8 %
BILIRUB SERPL-MCNC: <0.2 MG/DL (ref 0.2–0.7)
BILIRUBIN URINE: NEGATIVE
BLOOD, URINE: NEGATIVE
BUN BLDV-MCNC: 42 MG/DL (ref 8–23)
BUN BLDV-MCNC: 42 MG/DL (ref 8–23)
CALCIUM SERPL-MCNC: 9.2 MG/DL (ref 8.5–9.9)
CALCIUM SERPL-MCNC: 9.4 MG/DL (ref 8.5–9.9)
CHLORIDE BLD-SCNC: 109 MEQ/L (ref 95–107)
CHLORIDE BLD-SCNC: 111 MEQ/L (ref 95–107)
CHOLESTEROL, TOTAL: 120 MG/DL (ref 0–199)
CLARITY: CLEAR
CO2: 18 MEQ/L (ref 20–31)
CO2: 19 MEQ/L (ref 20–31)
COLOR: ABNORMAL
CREAT SERPL-MCNC: 2.21 MG/DL (ref 0.7–1.2)
CREAT SERPL-MCNC: 2.35 MG/DL (ref 0.7–1.2)
EOSINOPHILS ABSOLUTE: 0.1 K/UL (ref 0–0.7)
EOSINOPHILS RELATIVE PERCENT: 2.1 %
GFR AFRICAN AMERICAN: 32.9
GFR AFRICAN AMERICAN: 35.3
GFR NON-AFRICAN AMERICAN: 27.2
GFR NON-AFRICAN AMERICAN: 29.2
GLOBULIN: 3.1 G/DL (ref 2.3–3.5)
GLUCOSE BLD-MCNC: 100 MG/DL (ref 70–99)
GLUCOSE BLD-MCNC: 94 MG/DL (ref 70–99)
GLUCOSE URINE: NEGATIVE MG/DL
HBA1C MFR BLD: 5.2 % (ref 4.8–5.9)
HCT VFR BLD CALC: 32.1 % (ref 42–52)
HCT VFR BLD CALC: 32.1 % (ref 42–52)
HDLC SERPL-MCNC: 33 MG/DL (ref 40–59)
HEMOGLOBIN: 10.1 G/DL (ref 14–18)
HEMOGLOBIN: 10.1 G/DL (ref 14–18)
IRON SATURATION: 13 % (ref 11–46)
IRON: 29 UG/DL (ref 59–158)
KETONES, URINE: NEGATIVE MG/DL
LDL CHOLESTEROL CALCULATED: 70 MG/DL (ref 0–129)
LEUKOCYTE ESTERASE, URINE: ABNORMAL
LYMPHOCYTES ABSOLUTE: 0.9 K/UL (ref 1–4.8)
LYMPHOCYTES RELATIVE PERCENT: 14.7 %
MCH RBC QN AUTO: 27.3 PG (ref 27–31.3)
MCH RBC QN AUTO: 27.5 PG (ref 27–31.3)
MCHC RBC AUTO-ENTMCNC: 31.3 % (ref 33–37)
MCHC RBC AUTO-ENTMCNC: 31.4 % (ref 33–37)
MCV RBC AUTO: 87.2 FL (ref 80–100)
MCV RBC AUTO: 87.5 FL (ref 80–100)
MONOCYTES ABSOLUTE: 0.4 K/UL (ref 0.2–0.8)
MONOCYTES RELATIVE PERCENT: 6.7 %
NEUTROPHILS ABSOLUTE: 4.5 K/UL (ref 1.4–6.5)
NEUTROPHILS RELATIVE PERCENT: 75.7 %
NITRITE, URINE: NEGATIVE
PDW BLD-RTO: 17 % (ref 11.5–14.5)
PDW BLD-RTO: 17.3 % (ref 11.5–14.5)
PH UA: 6 (ref 5–9)
PHOSPHORUS: 3.9 MG/DL (ref 2.3–4.8)
PLATELET # BLD: 198 K/UL (ref 130–400)
PLATELET # BLD: 204 K/UL (ref 130–400)
POTASSIUM SERPL-SCNC: 5.6 MEQ/L (ref 3.4–4.9)
POTASSIUM SERPL-SCNC: 5.7 MEQ/L (ref 3.4–4.9)
PROSTATE SPECIFIC ANTIGEN: 2.4 NG/ML (ref 0–6.22)
PROTEIN UA: >=300 MG/DL
RBC # BLD: 3.67 M/UL (ref 4.7–6.1)
RBC # BLD: 3.68 M/UL (ref 4.7–6.1)
SODIUM BLD-SCNC: 141 MEQ/L (ref 135–144)
SODIUM BLD-SCNC: 143 MEQ/L (ref 135–144)
SPECIFIC GRAVITY UA: 1.02 (ref 1–1.03)
TOTAL IRON BINDING CAPACITY: 220 UG/DL (ref 178–450)
TOTAL PROTEIN: 6.7 G/DL (ref 6.3–8)
TRIGL SERPL-MCNC: 83 MG/DL (ref 0–150)
UROBILINOGEN, URINE: 0.2 E.U./DL
WBC # BLD: 6 K/UL (ref 4.8–10.8)
WBC # BLD: 6 K/UL (ref 4.8–10.8)

## 2020-10-03 LAB
BACTERIA: NEGATIVE /HPF
EPITHELIAL CELLS, UA: ABNORMAL /HPF (ref 0–5)
HYALINE CASTS: ABNORMAL /HPF (ref 0–5)
RBC UA: ABNORMAL /HPF (ref 0–2)
WBC UA: ABNORMAL /HPF (ref 0–5)

## 2020-10-06 LAB — ERYTHROPOIETIN: 15 MU/ML (ref 4–27)

## 2020-10-20 ENCOUNTER — VIRTUAL VISIT (OUTPATIENT)
Dept: FAMILY MEDICINE CLINIC | Age: 75
End: 2020-10-20
Payer: MEDICARE

## 2020-10-20 PROCEDURE — 99442 PR PHYS/QHP TELEPHONE EVALUATION 11-20 MIN: CPT | Performed by: FAMILY MEDICINE

## 2020-10-20 ASSESSMENT — ENCOUNTER SYMPTOMS
SHORTNESS OF BREATH: 0
EYES NEGATIVE: 1
GASTROINTESTINAL NEGATIVE: 1
RESPIRATORY NEGATIVE: 1
ALLERGIC/IMMUNOLOGIC NEGATIVE: 1

## 2020-10-20 NOTE — PROGRESS NOTES
Patient is seen in follow up for   Chief Complaint   Patient presents with    Diabetes    Hypertension    Hyperlipidemia   Pan Figueroa is a 77 y.o. male evaluated via telephone on 10/20/2020. Consent:  He and/or health care decision maker is aware that that he may receive a bill for this telephone service, depending on his insurance coverage, and has provided verbal consent to proceed: Yes      Documentation:  I communicated with the patient and/or health care decision maker about see below. Details of this discussion including any medical advice provided: see below      I affirm this is a Patient Initiated Episode with a Patient who has not had a related appointment within my department in the past 7 days or scheduled within the next 24 hours. Patient identification was verified at the start of the visit: Yes    Total Time: minutes: 11-20 minutes    Note: not billable if this call serves to triage the patient into an appointment for the relevant concern      [facility]     Hypertension   This is a chronic problem. The current episode started more than 1 year ago. The problem is unchanged. The problem is controlled. Pertinent negatives include no chest pain, palpitations or shortness of breath. There are no associated agents to hypertension. Past treatments include calcium channel blockers. Past Medical History:   Diagnosis Date    CVA (cerebral vascular accident) ([address]     Diverticulitis     Hyperlipidemia     Hypertension      Patient Active Problem List    Diagnosis Date Noted    Type 2 diabetes mellitus with complication, without long-term current use of insulin ([address] 09/09/2020    Morbid obesity with BMI of 50.0-59.9, adult ([address] 09/09/2020    Hypertension     Hyperlipidemia     Diverticulitis     CVA (cerebral vascular accident) ([address]      No past surgical history on file.   Family History   Problem Relation Age of Onset    Heart Disease Mother     Heart Disease Father    Iris Her Diabetes Sister     High Blood Pressure Sister      Social History     Socioeconomic History    Marital status:      Spouse name: Not on file    Number of children: Not on file    Years of education: Not on file    Highest education level: Not on file   Occupational History    Not on file   Social Needs    Financial resource strain: Not on file    Food insecurity     Worry: Not on file     Inability: Not on file    Transportation needs     Medical: Not on file     Non-medical: Not on file   Tobacco Use    Smoking status: Former Smoker     Packs/day: 0.20     Years: 5.00     Pack years: 1.00     Types: Cigars     Last attempt to quit: 2018     Years since quittin.7    Smokeless tobacco: Never Used   Substance and Sexual Activity    Alcohol use: Yes     Comment: social    Drug use: No    Sexual activity: Not on file   Lifestyle    Physical activity     Days per week: Not on file     Minutes per session: Not on file    Stress: Not on file   Relationships    Social connections     Talks on phone: Not on file     Gets together: Not on file     Attends Mu-ism service: Not on file     Active member of club or organization: Not on file     Attends meetings of clubs or organizations: Not on file     Relationship status: Not on file    Intimate partner violence     Fear of current or ex partner: Not on file     Emotionally abused: Not on file     Physically abused: Not on file     Forced sexual activity: Not on file   Other Topics Concern    Not on file   Social History Narrative    Not on file     Current Outpatient Medications   Medication Sig Dispense Refill    allopurinol (ZYLOPRIM) 300 MG tablet Take 1 tablet by mouth daily 90 tablet 3    minoxidil (LONITEN) 2.5 MG tablet Take 1 tablet by mouth daily 90 tablet 3    lisinopril (ZESTRIL) 10 MG tablet Take 1 tablet by mouth daily 90 tablet 3    labetalol (NORMODYNE) 200 MG tablet Take 1 tablet by mouth daily 90 tablet 3    glimepiride (AMARYL) 2 MG tablet Take 1 tablet by mouth daily 90 tablet 3    amLODIPine (NORVASC) 5 MG tablet Take 1 tablet by mouth daily 90 tablet 3    mometasone (ELOCON) 0.1 % cream Apply topically daily. 50 g 3    aspirin 81 MG tablet Take 1 tablet by mouth daily 90 tablet 3     No current facility-administered medications for this visit. Current Outpatient Medications on File Prior to Visit   Medication Sig Dispense Refill    allopurinol (ZYLOPRIM) 300 MG tablet Take 1 tablet by mouth daily 90 tablet 3    minoxidil (LONITEN) 2.5 MG tablet Take 1 tablet by mouth daily 90 tablet 3    lisinopril (ZESTRIL) 10 MG tablet Take 1 tablet by mouth daily 90 tablet 3    labetalol (NORMODYNE) 200 MG tablet Take 1 tablet by mouth daily 90 tablet 3    glimepiride (AMARYL) 2 MG tablet Take 1 tablet by mouth daily 90 tablet 3    amLODIPine (NORVASC) 5 MG tablet Take 1 tablet by mouth daily 90 tablet 3    mometasone (ELOCON) 0.1 % cream Apply topically daily. 50 g 3    aspirin 81 MG tablet Take 1 tablet by mouth daily 90 tablet 3     No current facility-administered medications on file prior to visit.       No Known Allergies  Health Maintenance   Topic Date Due    AAA screen  1945    Hepatitis C screen  1945    Diabetic foot exam  03/26/1956    Diabetic retinal exam  03/26/1956    Shingles Vaccine (1 of 2) 03/26/1996    Colon cancer screen colonoscopy  03/26/1996    Annual Wellness Visit (AWV)  06/20/2019    Flu vaccine (1) 09/01/2020    Diabetic microalbuminuria test  09/09/2021    A1C test (Diabetic or Prediabetic)  10/02/2021    Lipid screen  10/02/2021    Potassium monitoring  10/02/2021    Creatinine monitoring  10/02/2021    DTaP/Tdap/Td vaccine (2 - Td) 09/09/2030    Pneumococcal 65+ years Vaccine  Completed    Hepatitis A vaccine  Aged Out    Hib vaccine  Aged Out    Meningococcal (ACWY) vaccine  Aged Out       Review of Systems     Review of Systems   Constitutional: Negative for activity change, appetite change, chills, fever and unexpected weight change. HENT: Negative. Eyes: Negative. Respiratory: Negative. Negative for shortness of breath. Cardiovascular: Negative. Negative for chest pain and palpitations. Gastrointestinal: Negative. Endocrine: Negative. Genitourinary: Negative. Musculoskeletal: Negative. Skin: Negative. Allergic/Immunologic: Negative. Neurological: Negative. Hematological: Negative. Psychiatric/Behavioral: Negative. Physical Exam  There were no vitals filed for this visit. Physical Exam  Constitutional:       Appearance: He is well-developed. HENT:      Right Ear: External ear normal.      Left Ear: External ear normal.   Eyes:      Conjunctiva/sclera: Conjunctivae normal.      Pupils: Pupils are equal, round, and reactive to light. Neck:      Musculoskeletal: Normal range of motion and neck supple. Thyroid: No thyromegaly. Cardiovascular:      Rate and Rhythm: Normal rate and regular rhythm. Heart sounds: Normal heart sounds. No murmur. No friction rub. No gallop. Pulmonary:      Effort: Pulmonary effort is normal. No respiratory distress. Breath sounds: Normal breath sounds. No wheezing. Abdominal:      General: Bowel sounds are normal. There is no distension. Palpations: Abdomen is soft. There is no mass. Tenderness: There is no abdominal tenderness. There is no guarding or rebound. Hernia: No hernia is present. Genitourinary:     Penis: Normal.    Musculoskeletal: Normal range of motion. General: No tenderness. Lymphadenopathy:      Cervical: No cervical adenopathy. Skin:     General: Skin is warm and dry. Neurological:      Mental Status: He is alert and oriented to person, place, and time. Cranial Nerves: No cranial nerve deficit. Coordination: Coordination normal.         Assessment   Diagnosis Orders   1.  Type 2 diabetes mellitus with complication, without long-term current use of insulin ([facility]     2. Morbid obesity with BMI of 50.0-59.9, adult ([facility]     3. Essential hypertension     4. Anemia due to stage 3b chronic kidney disease       Problem List     Hypertension    Type 2 diabetes mellitus with complication, without long-term current use of insulin ([facility]CC) - Primary    Relevant Medications    glimepiride (AMARYL) 2 MG tablet    Morbid obesity with BMI of 50.0-59.9, adult ([facility])    Relevant Medications    glimepiride (AMARYL) 2 MG tablet          Plan  No orders of the defined types were placed in this encounter. No orders of the defined types were placed in this encounter. Return if symptoms worsen or fail to improve.   Edwin Bradford MD

## 2020-11-09 ENCOUNTER — TELEPHONE (OUTPATIENT)
Dept: FAMILY MEDICINE CLINIC | Age: 75
End: 2020-11-09

## 2020-11-10 RX ORDER — AMLODIPINE BESYLATE 10 MG/1
10 TABLET ORAL DAILY
Qty: 90 TABLET | Refills: 3 | Status: SHIPPED | OUTPATIENT
Start: 2020-11-10 | End: 2021-12-20 | Stop reason: SDUPTHER

## 2021-01-28 ENCOUNTER — TELEPHONE (OUTPATIENT)
Dept: FAMILY MEDICINE CLINIC | Age: 76
End: 2021-01-28

## 2021-01-28 NOTE — TELEPHONE ENCOUNTER
Patient was started on coumadin  1/15/21 @ 2.5 mg daily. He was DC 1/22/21.   Krystyna Suarez calling in INR    INR= 1.6

## 2021-02-04 ENCOUNTER — TELEPHONE (OUTPATIENT)
Dept: FAMILY MEDICINE CLINIC | Age: 76
End: 2021-02-04

## 2021-02-04 DIAGNOSIS — I63.9 CEREBROVASCULAR ACCIDENT (CVA), UNSPECIFIED MECHANISM (HCC): Primary | ICD-10-CM

## 2021-02-04 DIAGNOSIS — Z29.9 DVT PROPHYLAXIS: ICD-10-CM

## 2021-02-04 NOTE — TELEPHONE ENCOUNTER
Wants to report patient INR ; of 1.7     Also would like a order for patient to do outpatient labs in his home due to him being a hard stick.

## 2021-02-05 ENCOUNTER — TELEPHONE (OUTPATIENT)
Dept: FAMILY MEDICINE CLINIC | Age: 76
End: 2021-02-05

## 2021-02-05 RX ORDER — WARFARIN SODIUM 2.5 MG/1
2.5 TABLET ORAL DAILY
Qty: 90 TABLET | Refills: 3 | Status: SHIPPED | OUTPATIENT
Start: 2021-02-05 | End: 2022-10-07

## 2021-02-05 NOTE — TELEPHONE ENCOUNTER
Sallie Arriaza calls from [facility]. They received the order for monthly INR draws. They do not offer JUST lab draws of any kind. They will draw labs when it is needed while making scheduled visits for other services. They suggest a company to call:     [employer]  Phone: [phone]

## 2021-02-05 NOTE — TELEPHONE ENCOUNTER
Called and spoke to Devonte, she said that she'd call patient and inform of INR and to continue same dosage

## 2021-02-05 NOTE — TELEPHONE ENCOUNTER
Called and spoke to Augustine Powell again to confirm a few pieces of information. Patient is currently taking Coumadin 2.5mg tablets.   -Alternating 2.5mg and 5mg every other day    Patient is taking Coumadin for DVT Prophylaxis. Form completed for [facility] and faxing with insurance cards, demographics along with the order for PT/INR draws.      Please sign for Coumadin script

## 2021-02-09 NOTE — TELEPHONE ENCOUNTER
Pharmacy called because patient has been alternating dosage, they need clarification for the Rx. Please call when able.

## 2021-02-09 NOTE — TELEPHONE ENCOUNTER
Spoke to [facility] and straightened out the script. He's changing directions to 1-2 times daily #60 with 1 refill.

## 2021-02-11 ENCOUNTER — TELEPHONE (OUTPATIENT)
Dept: FAMILY MEDICINE CLINIC | Age: 76
End: 2021-02-11

## 2021-02-11 NOTE — TELEPHONE ENCOUNTER
Lou from [facility] called checking on status of in home lab orders. Patient thought someone was coming out to the home but no one showed. She said the patient needs a cbc and bmp twice weekly until his labs are stable. Patient is home bound and a hard stick. She had originally called on 2/4 with this request.  Please contact [phone] if there are any questions.   Thank you

## 2021-02-11 NOTE — TELEPHONE ENCOUNTER
Called and spoke to [facility] - They stated that he's scheduled for 2/18. I informed them that he was not aware of this draw, but needs to be drawn sooner so they're going to go on 2/16. They'll be calling patient to let him know of setup. I called and spoke to Chema Lazaro and she's aware that they'll be going on Tuesday.

## 2021-02-16 RX ORDER — METOPROLOL SUCCINATE 25 MG/1
TABLET, EXTENDED RELEASE ORAL
COMMUNITY
Start: 2021-01-06 | End: 2021-02-16 | Stop reason: SDUPTHER

## 2021-02-16 RX ORDER — METOPROLOL SUCCINATE 25 MG/1
TABLET, EXTENDED RELEASE ORAL
Qty: 45 TABLET | Refills: 3 | Status: SHIPPED | OUTPATIENT
Start: 2021-02-16 | End: 2022-10-07

## 2021-02-16 RX ORDER — ATORVASTATIN CALCIUM 40 MG/1
40 TABLET, FILM COATED ORAL NIGHTLY
COMMUNITY
Start: 2021-01-05 | End: 2021-02-16 | Stop reason: SDUPTHER

## 2021-02-16 RX ORDER — POTASSIUM CHLORIDE 20 MEQ/1
20 TABLET, EXTENDED RELEASE ORAL DAILY
COMMUNITY
Start: 2021-01-06 | End: 2022-10-07

## 2021-02-16 RX ORDER — TORSEMIDE 20 MG/1
20 TABLET ORAL 2 TIMES DAILY
Qty: 180 TABLET | Refills: 3 | Status: SHIPPED | OUTPATIENT
Start: 2021-02-16 | End: 2022-03-08 | Stop reason: SDUPTHER

## 2021-02-16 RX ORDER — LORATADINE 10 MG/1
10 TABLET ORAL DAILY
Qty: 90 TABLET | Refills: 3 | Status: SHIPPED | OUTPATIENT
Start: 2021-02-16 | End: 2021-03-18

## 2021-02-16 RX ORDER — TORSEMIDE 20 MG/1
TABLET ORAL
COMMUNITY
Start: 2021-01-06 | End: 2021-02-16 | Stop reason: SDUPTHER

## 2021-02-16 RX ORDER — ATORVASTATIN CALCIUM 40 MG/1
40 TABLET, FILM COATED ORAL NIGHTLY
Qty: 90 TABLET | Refills: 3 | Status: SHIPPED | OUTPATIENT
Start: 2021-02-16 | End: 2022-01-06 | Stop reason: SDUPTHER

## 2021-02-18 ENCOUNTER — TELEPHONE (OUTPATIENT)
Dept: FAMILY MEDICINE CLINIC | Age: 76
End: 2021-02-18

## 2021-02-18 NOTE — TELEPHONE ENCOUNTER
[facility] called office stating pt's PT/INR readings today are 2.8    Asking provider if any changes needed w/ med and when next draw should be.

## 2021-02-19 NOTE — TELEPHONE ENCOUNTER
Called and spoke to Chema Lazaro letting her know to keep the same dose of coumadin and to repeat again in 1 week.

## 2021-02-25 ENCOUNTER — TELEPHONE (OUTPATIENT)
Dept: FAMILY MEDICINE CLINIC | Age: 76
End: 2021-02-25

## 2021-03-03 ENCOUNTER — TELEPHONE (OUTPATIENT)
Dept: FAMILY MEDICINE CLINIC | Age: 76
End: 2021-03-03

## 2021-03-25 ENCOUNTER — TELEPHONE (OUTPATIENT)
Dept: FAMILY MEDICINE CLINIC | Age: 76
End: 2021-03-25

## 2021-03-25 NOTE — TELEPHONE ENCOUNTER
Jessy Spicer from [facility] called to report PT/INR reading today is 2.1. If there are any changes, please call Jessy Spicer at [phone]. Thank you.

## 2021-04-25 ENCOUNTER — TELEPHONE (OUTPATIENT)
Dept: PRIMARY CARE CLINIC | Age: 76
End: 2021-04-25

## 2021-07-02 ENCOUNTER — VIRTUAL VISIT (OUTPATIENT)
Dept: FAMILY MEDICINE CLINIC | Age: 76
End: 2021-07-02
Payer: MEDICARE

## 2021-07-02 DIAGNOSIS — Z00.00 ROUTINE GENERAL MEDICAL EXAMINATION AT A HEALTH CARE FACILITY: Primary | ICD-10-CM

## 2021-07-02 PROCEDURE — G0438 PPPS, INITIAL VISIT: HCPCS | Performed by: NURSE PRACTITIONER

## 2021-07-02 SDOH — ECONOMIC STABILITY: FOOD INSECURITY: WITHIN THE PAST 12 MONTHS, THE FOOD YOU BOUGHT JUST DIDN'T LAST AND YOU DIDN'T HAVE MONEY TO GET MORE.: NEVER TRUE

## 2021-07-02 SDOH — ECONOMIC STABILITY: FOOD INSECURITY: WITHIN THE PAST 12 MONTHS, YOU WORRIED THAT YOUR FOOD WOULD RUN OUT BEFORE YOU GOT MONEY TO BUY MORE.: NEVER TRUE

## 2021-07-02 SDOH — ECONOMIC STABILITY: TRANSPORTATION INSECURITY
IN THE PAST 12 MONTHS, HAS LACK OF TRANSPORTATION KEPT YOU FROM MEETINGS, WORK, OR FROM GETTING THINGS NEEDED FOR DAILY LIVING?: NO

## 2021-07-02 SDOH — ECONOMIC STABILITY: TRANSPORTATION INSECURITY
IN THE PAST 12 MONTHS, HAS THE LACK OF TRANSPORTATION KEPT YOU FROM MEDICAL APPOINTMENTS OR FROM GETTING MEDICATIONS?: NO

## 2021-07-02 ASSESSMENT — PATIENT HEALTH QUESTIONNAIRE - PHQ9
SUM OF ALL RESPONSES TO PHQ QUESTIONS 1-9: 0
SUM OF ALL RESPONSES TO PHQ QUESTIONS 1-9: 0
SUM OF ALL RESPONSES TO PHQ9 QUESTIONS 1 & 2: 0
1. LITTLE INTEREST OR PLEASURE IN DOING THINGS: 0
2. FEELING DOWN, DEPRESSED OR HOPELESS: 0
SUM OF ALL RESPONSES TO PHQ QUESTIONS 1-9: 0

## 2021-07-02 ASSESSMENT — LIFESTYLE VARIABLES
AUDIT-C TOTAL SCORE: 3
HOW OFTEN DURING THE LAST YEAR HAVE YOU BEEN UNABLE TO REMEMBER WHAT HAPPENED THE NIGHT BEFORE BECAUSE YOU HAD BEEN DRINKING: 0
HOW OFTEN DO YOU HAVE A DRINK CONTAINING ALCOHOL: 3
AUDIT TOTAL SCORE: 3
HOW OFTEN DURING THE LAST YEAR HAVE YOU FAILED TO DO WHAT WAS NORMALLY EXPECTED FROM YOU BECAUSE OF DRINKING: 0
HOW OFTEN DURING THE LAST YEAR HAVE YOU NEEDED AN ALCOHOLIC DRINK FIRST THING IN THE MORNING TO GET YOURSELF GOING AFTER A NIGHT OF HEAVY DRINKING: 0
HAVE YOU OR SOMEONE ELSE BEEN INJURED AS A RESULT OF YOUR DRINKING: 0
HOW OFTEN DURING THE LAST YEAR HAVE YOU HAD A FEELING OF GUILT OR REMORSE AFTER DRINKING: 0
HOW OFTEN DURING THE LAST YEAR HAVE YOU FOUND THAT YOU WERE NOT ABLE TO STOP DRINKING ONCE YOU HAD STARTED: 0
HOW OFTEN DO YOU HAVE SIX OR MORE DRINKS ON ONE OCCASION: 0
HAS A RELATIVE, FRIEND, DOCTOR, OR ANOTHER HEALTH PROFESSIONAL EXPRESSED CONCERN ABOUT YOUR DRINKING OR SUGGESTED YOU CUT DOWN: 0
HOW MANY STANDARD DRINKS CONTAINING ALCOHOL DO YOU HAVE ON A TYPICAL DAY: 0

## 2021-07-02 ASSESSMENT — SOCIAL DETERMINANTS OF HEALTH (SDOH): HOW HARD IS IT FOR YOU TO PAY FOR THE VERY BASICS LIKE FOOD, HOUSING, MEDICAL CARE, AND HEATING?: NOT HARD AT ALL

## 2021-07-02 NOTE — PATIENT INSTRUCTIONS
Personalized Preventive Plan for Simone Ahumada - 7/2/2021  Medicare offers a range of preventive health benefits. Some of the tests and screenings are paid in full while other may be subject to a deductible, co-insurance, and/or copay. Some of these benefits include a comprehensive review of your medical history including lifestyle, illnesses that may run in your family, and various assessments and screenings as appropriate. After reviewing your medical record and screening and assessments performed today your provider may have ordered immunizations, labs, imaging, and/or referrals for you. A list of these orders (if applicable) as well as your Preventive Care list are included within your After Visit Summary for your review. Other Preventive Recommendations:    · A preventive eye exam performed by an eye specialist is recommended every 1-2 years to screen for glaucoma; cataracts, macular degeneration, and other eye disorders. · A preventive dental visit is recommended every 6 months. · Try to get at least 150 minutes of exercise per week or 10,000 steps per day on a pedometer . · Order or download the FREE \"Exercise & Physical Activity: Your Everyday Guide\" from [employer]. Call 5-939.907.6778 or search [employer] online. · You need 4071-2935 mg of calcium and 8577-2158 IU of vitamin D per day. It is possible to meet your calcium requirement with diet alone, but a vitamin D supplement is usually necessary to meet this goal.  · When exposed to the sun, use a sunscreen that protects against both UVA and UVB radiation with an SPF of 30 or greater. Reapply every 2 to 3 hours or after sweating, drying off with a towel, or swimming. · Always wear a seat belt when traveling in a car. Always wear a helmet when riding a bicycle or motorcycle.

## 2021-07-02 NOTE — PROGRESS NOTES
Medicare Annual Wellness Visit  Are Name: Dino Chin Date: 2021   MRN: [id number] Sex: Male   Age: 77 y.o. Ethnicity: Non-/Non    : 1945 Race: Kee Collazo is here for Medicare AWV    Screenings for behavioral, psychosocial and functional/safety risks, and cognitive dysfunction are all negative except as indicated below. These results, as well as other patient data from the [address] form, are documented in Flowsheets linked to this Encounter. No Known Allergies    Prior to Visit Medications    Medication Sig Taking? Authorizing Provider   potassium chloride (KLOR-CON M) 20 MEQ extended release tablet Take 20 mEq by mouth daily  Historical Provider, MD   FUROSEMIDE PO   Historical Provider, MD   torsemide (DEMADEX) 20 MG tablet Take 1 tablet by mouth 2 times daily  Carnella Rubinstein, APRN - CNP   atorvastatin (LIPITOR) 40 MG tablet Take 1 tablet by mouth nightly  Carnella Rubinstein, APRN - CNP   metoprolol succinate (TOPROL XL) 25 MG extended release tablet Take 1/2 tablet by mouth daily  Carnella Rubinstein, APRN - CNP   warfarin (COUMADIN) 2.5 MG tablet Take 1 tablet by mouth daily  Elvis Upton MD   amLODIPine (NORVASC) 10 MG tablet Take 1 tablet by mouth daily  Elvis Upton MD   allopurinol (ZYLOPRIM) 300 MG tablet Take 1 tablet by mouth daily  Elvis Upton MD   minoxidil (LONITEN) 2.5 MG tablet Take 1 tablet by mouth daily  Elvis Upton MD   lisinopril (ZESTRIL) 10 MG tablet Take 1 tablet by mouth daily  Elvis Upton MD   labetalol (NORMODYNE) 200 MG tablet Take 1 tablet by mouth daily  Elvis Upton MD   glimepiride (AMARYL) 2 MG tablet Take 1 tablet by mouth daily  Elvis Upton MD   aspirin 81 MG tablet Take 1 tablet by mouth daily  Elvis Upton MD       Past Medical History:   Diagnosis Date    CVA (cerebral vascular accident) ([address] [address]     Diverticulitis     Hyperlipidemia     Hypertension        History reviewed.  No pertinent surgical history. Family History   Problem Relation Age of Onset    Heart Disease Mother     Heart Disease Father     Diabetes Sister     High Blood Pressure Sister        CareTeam (Including outside providers/suppliers regularly involved in providing care):   Patient Care Team:  Panchito Elise MD as PCP - Sofia Christianson MD as PCP - [facility] Empaneled Provider    Wt Readings from Last 3 Encounters:   09/09/20 (!) 340 lb (154.2 kg)   09/17/19 (!) 358 lb (162.4 kg)   09/06/19 (!) 360 lb (163.3 kg)      No flowsheet data found. There is no height or weight on file to calculate BMI. Based upon direct observation of the patient, evaluation of cognition reveals recent and remote memory intact. Patient's complete Health Risk Assessment and screening values have been reviewed and are found in Flowsheets. The following problems were reviewed today and where indicated follow up appointments were made and/or referrals ordered. Positive Risk Factor Screenings with Interventions:     Fall Risk:  2 or more falls in past year?: (!) yes (Sick in October and fell secondary to being weak. Slipped on wet floor.)  Fall with injury in past year?: no  Fall Risk Interventions:    · Home safety tips provided        General Health and ACP:  General  In general, how would you say your health is?: Good  In the past 7 days, have you experienced any of the following?  New or Increased Pain, New or Increased Fatigue, Loneliness, Social Isolation, Stress or Anger?: None of These  Do you get the social and emotional support that you need?: Yes  Do you have a Living Will?: Yes  Advance Directives     Power of [address] [place] [address] Will ACP-Advance Directive ACP-Power of     Not on File Not on File Not on File Not on File      General Health Risk Interventions:  · No concerns at this time    Health Habits/Nutrition:  Health Habits/Nutrition  Do you exercise for at least 20 minutes 2-3 times per week?: Yes  Have you lost any weight without trying in the past 3 months?: (!) Yes (Reports he lost sense of taste with Covid and still has no appetite.)  Do you eat only one meal per day?: No  Have you seen the dentist within the past year?: Yes     Health Habits/Nutrition Interventions:  · No concerns at this time    Hearing/Vision:  No exam data present  Hearing/Vision  Do you or your family notice any trouble with your hearing that hasn't been managed with hearing aids?: No  Do you have difficulty driving, watching TV, or doing any of your daily activities because of your eyesight?: No  Have you had an eye exam within the past year?: (!) No  Hearing/Vision Interventions:  · Has not had eye exam in years- encouraged to make an appointment. ADL:  ADLs  In the past 7 days, did you need help from others to perform any of the following everyday activities? Eating, dressing, grooming, bathing, toileting, or walking/balance?: None  In the past 7 days, did you need help from others to take care of any of the following?  Laundry, housekeeping, banking/finances, shopping, telephone use, food preparation, transportation, or taking medications?: [employer], Housekeeping, Shopping, Food Preparation, Transportation (Wife assists patient)  ADL Interventions:  · Patient declines any further evaluation/treatment for this issue    Personalized Preventive Plan   Current Health Maintenance Status  Immunization History   Administered Date(s) Administered    COVID-19, Hickman Peter, PF, 30mcg/0.3mL 04/09/2021, 05/07/2021    Pneumococcal Conjugate 13-valent (Rosellen Sill) 06/09/2016    Pneumococcal Polysaccharide ([id number]) 08/17/2017    Tdap (Boostrix, Adacel) 09/09/2020        Health Maintenance   Topic Date Due    AAA screen  Never done    Hepatitis C screen  Never done    Diabetic foot exam  Never done    Diabetic retinal exam  Never done    Shingles Vaccine (1 of 2) Never done    Colon cancer screen colonoscopy  Never done   [employer] Visit (AWV) Never done    Flu vaccine (1) 09/01/2021    A1C test (Diabetic or Prediabetic)  10/02/2021    Lipid screen  10/02/2021    Potassium monitoring  10/02/2021    Creatinine monitoring  10/02/2021    DTaP/Tdap/Td vaccine (2 - Td or Tdap) 09/09/2030    Pneumococcal 65+ years Vaccine  Completed    COVID-19 Vaccine  Completed    Hepatitis A vaccine  Aged Out    Hib vaccine  Aged Out    Meningococcal (ACWY) vaccine  Aged Out     Recommendations for [employer] Due: see orders and patient instructions/AVS.  . Recommended screening schedule for the next 5-10 years is provided to the patient in written form: see Patient Instructions/AVS.    Dameon Galan was seen today for medicare awv. Diagnoses and all orders for this visit:    Routine general medical examination at a health care facility               Pan Figueroa is a 77 y.o. male being evaluated by a Virtual Visit (phone) encounter to address concerns as mentioned above. A caregiver was present when appropriate. Due to this being a TeleHealth encounter (During [facility] public health emergency), evaluation of the following organ systems was limited: Vitals/Constitutional/EENT/Resp/CV/GI//MS/Neuro/Skin/Heme-Lymph-Imm. Pursuant to the emergency declaration under the [address] [place] [address], [address] [place] [address] authority and the [employer] Act, this Virtual Visit was conducted with patient's (and/or legal guardian's) consent, to reduce the patient's risk of exposure to COVID-19 and provide necessary medical care. The patient (and/or legal guardian) has also been advised to contact this office for worsening conditions or problems, and seek emergency medical treatment and/or call 911 if deemed necessary. Patient identification was verified at the start of the visit: Yes    Services were provided through phone to substitute for in-person clinic visit.  Patient and provider were located at their individual homes. --Patrick Dupont, APRN - CNP on 7/2/2021 at 11:26 AM    An electronic signature was used to authenticate this note.

## 2021-12-20 RX ORDER — AMLODIPINE BESYLATE 10 MG/1
10 TABLET ORAL DAILY
Qty: 90 TABLET | Refills: 3 | Status: SHIPPED | OUTPATIENT
Start: 2021-12-20

## 2022-01-04 ENCOUNTER — OFFICE VISIT (OUTPATIENT)
Dept: FAMILY MEDICINE CLINIC | Age: 77
End: 2022-01-04
Payer: MEDICARE

## 2022-01-04 VITALS
HEART RATE: 77 BPM | SYSTOLIC BLOOD PRESSURE: 134 MMHG | BODY MASS INDEX: 42.06 KG/M2 | DIASTOLIC BLOOD PRESSURE: 70 MMHG | TEMPERATURE: 98.6 F | WEIGHT: 284 LBS | RESPIRATION RATE: 15 BRPM | HEIGHT: 69 IN | OXYGEN SATURATION: 98 %

## 2022-01-04 DIAGNOSIS — E78.5 HYPERLIPIDEMIA, UNSPECIFIED HYPERLIPIDEMIA TYPE: ICD-10-CM

## 2022-01-04 DIAGNOSIS — I10 PRIMARY HYPERTENSION: ICD-10-CM

## 2022-01-04 DIAGNOSIS — E11.8 TYPE 2 DIABETES MELLITUS WITH COMPLICATION, WITHOUT LONG-TERM CURRENT USE OF INSULIN (HCC): Primary | ICD-10-CM

## 2022-01-04 DIAGNOSIS — R27.0 ATAXIA: ICD-10-CM

## 2022-01-04 DIAGNOSIS — Z12.5 SCREENING PSA (PROSTATE SPECIFIC ANTIGEN): ICD-10-CM

## 2022-01-04 DIAGNOSIS — N18.32 ANEMIA DUE TO STAGE 3B CHRONIC KIDNEY DISEASE (HCC): ICD-10-CM

## 2022-01-04 DIAGNOSIS — E66.01 OBESITY, CLASS III, BMI 40-49.9 (MORBID OBESITY) (HCC): ICD-10-CM

## 2022-01-04 DIAGNOSIS — D63.1 ANEMIA DUE TO STAGE 3B CHRONIC KIDNEY DISEASE (HCC): ICD-10-CM

## 2022-01-04 DIAGNOSIS — N18.4 CRI (CHRONIC RENAL INSUFFICIENCY), STAGE 4 (SEVERE) (HCC): ICD-10-CM

## 2022-01-04 DIAGNOSIS — E11.8 TYPE 2 DIABETES MELLITUS WITH COMPLICATION, WITHOUT LONG-TERM CURRENT USE OF INSULIN (HCC): ICD-10-CM

## 2022-01-04 LAB
ALBUMIN SERPL-MCNC: 3.9 G/DL (ref 3.5–4.6)
ALP BLD-CCNC: 125 U/L (ref 35–104)
ALT SERPL-CCNC: 12 U/L (ref 0–41)
ANION GAP SERPL CALCULATED.3IONS-SCNC: 14 MEQ/L (ref 9–15)
AST SERPL-CCNC: 13 U/L (ref 0–40)
BASOPHILS ABSOLUTE: 0.1 K/UL (ref 0–0.2)
BASOPHILS RELATIVE PERCENT: 0.8 %
BILIRUB SERPL-MCNC: 0.3 MG/DL (ref 0.2–0.7)
BUN BLDV-MCNC: 70 MG/DL (ref 8–23)
CALCIUM SERPL-MCNC: 9.6 MG/DL (ref 8.5–9.9)
CHLORIDE BLD-SCNC: 104 MEQ/L (ref 95–107)
CHOLESTEROL, TOTAL: 109 MG/DL (ref 0–199)
CO2: 24 MEQ/L (ref 20–31)
CREAT SERPL-MCNC: 3.13 MG/DL (ref 0.7–1.2)
CREATININE URINE: 50 MG/DL
EOSINOPHILS ABSOLUTE: 0.1 K/UL (ref 0–0.7)
EOSINOPHILS RELATIVE PERCENT: 1.6 %
GFR AFRICAN AMERICAN: 23.6
GFR NON-AFRICAN AMERICAN: 19.5
GLOBULIN: 3.7 G/DL (ref 2.3–3.5)
GLUCOSE BLD-MCNC: 155 MG/DL (ref 70–99)
HBA1C MFR BLD: 5.6 %
HCT VFR BLD CALC: 31.9 % (ref 42–52)
HDLC SERPL-MCNC: 43 MG/DL (ref 40–59)
HEMOGLOBIN: 10.3 G/DL (ref 14–18)
LDL CHOLESTEROL CALCULATED: 57 MG/DL (ref 0–129)
LYMPHOCYTES ABSOLUTE: 0.7 K/UL (ref 1–4.8)
LYMPHOCYTES RELATIVE PERCENT: 10.1 %
MCH RBC QN AUTO: 27.8 PG (ref 27–31.3)
MCHC RBC AUTO-ENTMCNC: 32.3 % (ref 33–37)
MCV RBC AUTO: 86.2 FL (ref 80–100)
MICROALBUMIN UR-MCNC: 67.3 MG/DL
MICROALBUMIN/CREAT UR-RTO: 1346 MG/G (ref 0–30)
MONOCYTES ABSOLUTE: 0.5 K/UL (ref 0.2–0.8)
MONOCYTES RELATIVE PERCENT: 7.3 %
NEUTROPHILS ABSOLUTE: 5.2 K/UL (ref 1.4–6.5)
NEUTROPHILS RELATIVE PERCENT: 80.2 %
PDW BLD-RTO: 15 % (ref 11.5–14.5)
PLATELET # BLD: 223 K/UL (ref 130–400)
POTASSIUM SERPL-SCNC: 4.5 MEQ/L (ref 3.4–4.9)
PROSTATE SPECIFIC ANTIGEN: 2.03 NG/ML (ref 0–4)
RBC # BLD: 3.7 M/UL (ref 4.7–6.1)
SODIUM BLD-SCNC: 142 MEQ/L (ref 135–144)
TOTAL PROTEIN: 7.6 G/DL (ref 6.3–8)
TRIGL SERPL-MCNC: 47 MG/DL (ref 0–150)
WBC # BLD: 6.5 K/UL (ref 4.8–10.8)

## 2022-01-04 PROCEDURE — G8427 DOCREV CUR MEDS BY ELIG CLIN: HCPCS | Performed by: FAMILY MEDICINE

## 2022-01-04 PROCEDURE — 99214 OFFICE O/P EST MOD 30 MIN: CPT | Performed by: FAMILY MEDICINE

## 2022-01-04 PROCEDURE — 1123F ACP DISCUSS/DSCN MKR DOCD: CPT | Performed by: FAMILY MEDICINE

## 2022-01-04 PROCEDURE — 1036F TOBACCO NON-USER: CPT | Performed by: FAMILY MEDICINE

## 2022-01-04 PROCEDURE — G8484 FLU IMMUNIZE NO ADMIN: HCPCS | Performed by: FAMILY MEDICINE

## 2022-01-04 PROCEDURE — 2022F DILAT RTA XM EVC RTNOPTHY: CPT | Performed by: FAMILY MEDICINE

## 2022-01-04 PROCEDURE — G8417 CALC BMI ABV UP PARAM F/U: HCPCS | Performed by: FAMILY MEDICINE

## 2022-01-04 PROCEDURE — 83036 HEMOGLOBIN GLYCOSYLATED A1C: CPT | Performed by: FAMILY MEDICINE

## 2022-01-04 PROCEDURE — 3017F COLORECTAL CA SCREEN DOC REV: CPT | Performed by: FAMILY MEDICINE

## 2022-01-04 PROCEDURE — 3044F HG A1C LEVEL LT 7.0%: CPT | Performed by: FAMILY MEDICINE

## 2022-01-04 PROCEDURE — 4040F PNEUMOC VAC/ADMIN/RCVD: CPT | Performed by: FAMILY MEDICINE

## 2022-01-04 RX ORDER — MINOXIDIL 2.5 MG/1
2.5 TABLET ORAL DAILY
Qty: 90 TABLET | Refills: 3 | Status: SHIPPED | OUTPATIENT
Start: 2022-01-04

## 2022-01-04 RX ORDER — ALLOPURINOL 300 MG/1
300 TABLET ORAL DAILY
Qty: 90 TABLET | Refills: 3 | Status: SHIPPED | OUTPATIENT
Start: 2022-01-04

## 2022-01-04 ASSESSMENT — ENCOUNTER SYMPTOMS
SHORTNESS OF BREATH: 0
ALLERGIC/IMMUNOLOGIC NEGATIVE: 1
EYES NEGATIVE: 1
RESPIRATORY NEGATIVE: 1
GASTROINTESTINAL NEGATIVE: 1

## 2022-01-04 NOTE — PROGRESS NOTES
Patient is seen in follow up for   Chief Complaint   Patient presents with    Check-Up    Diabetes    Hyperlipidemia    Hypertension     Diabetes  He presents for his follow-up diabetic visit. He has type 2 diabetes mellitus. His disease course has been stable. There are no hypoglycemic associated symptoms. There are no diabetic associated symptoms. Pertinent negatives for diabetes include no chest pain. There are no hypoglycemic complications. Symptoms are stable. Diabetic complications include a CVA. Risk factors for coronary artery disease include diabetes mellitus, hypertension and male sex. Current diabetic treatment includes oral agent (dual therapy). He is compliant with treatment most of the time. There is no change in his home blood glucose trend. Hyperlipidemia  This is a chronic problem. The current episode started more than 1 year ago. The problem is controlled. Pertinent negatives include no chest pain or shortness of breath. Current antihyperlipidemic treatment includes statins. The current treatment provides moderate improvement of lipids. Risk factors for coronary artery disease include hypertension, male sex and diabetes mellitus. Hypertension  This is a chronic problem. The current episode started more than 1 year ago. The problem is unchanged. The problem is controlled. Pertinent negatives include no chest pain, palpitations or shortness of breath. There are no associated agents to hypertension. Risk factors for coronary artery disease include diabetes mellitus, male gender and obesity. Past treatments include calcium channel blockers, beta blockers and ACE inhibitors. The current treatment provides moderate improvement. Hypertensive end-organ damage includes CVA.        Past Medical History:   Diagnosis Date    [facility]     Diverticulitis     Hyperlipidemia     Hypertension      Patient Active Problem List    Diagnosis Date Noted    Anemia due to stage 3b chronic kidney disease ([facility] 2022    Type 2 diabetes mellitus with complication, without long-term current use of insulin ([place] [address] 2020    Morbid obesity with BMI of 50.0-59.9, adult ([facility] 2020    Hypertension     Hyperlipidemia     Diverticulitis     CVA (cerebral vascular accident) ([place] [address]      No past surgical history on file. Family History   Problem Relation Age of Onset    Heart Disease Mother     Heart Disease Father     Diabetes Sister     High Blood Pressure Sister      Social History     Socioeconomic History    Marital status:      Spouse name: Not on file    Number of children: Not on file    Years of education: Not on file    Highest education level: Not on file   Occupational History    Not on file   Tobacco Use    Smoking status: Former Smoker     Packs/day: 0.20     Years: 5.00     Pack years: 1.00     Types: Cigars     Quit date: 2018     Years since quittin.0    Smokeless tobacco: Never Used   Substance and Sexual Activity    Alcohol use: Yes     Comment: social    Drug use: No    Sexual activity: Not on file   Other Topics Concern    Not on file   Social History Narrative    Not on file     Social Determinants of Health     Financial Resource Strain: Low Risk     Difficulty of Paying Living Expenses: Not hard at all   Food Insecurity: No Food Insecurity    Worried About [address] in the Last Year: Never true    [address] in the Last Year: Never true   Transportation Needs: No Transportation Needs    Lack of Transportation (Medical): No    Lack of Transportation (Non-Medical):  No   Physical Activity:     Days of Exercise per Week: Not on file    Minutes of Exercise per Session: Not on file   Stress:     Feeling of Stress : Not on file   Social Connections:     Frequency of Communication with Friends and Family: Not on file    Frequency of Social Gatherings with Friends and Family: Not on file    Attends Baptism Services: Not on file   [facility] or Organizations: Not on file    Attends Club or Organization Meetings: Not on file    Marital Status: Not on file   Intimate Partner Violence:     Fear of Current or Ex-Partner: Not on file    Emotionally Abused: Not on file    Physically Abused: Not on file    Sexually Abused: Not on file   Housing Stability:     Unable to Pay for Housing in the Last Year: Not on file    Number of [place] in the Last Year: Not on file    Unstable Housing in the Last Year: Not on file     Current Outpatient Medications   Medication Sig Dispense Refill    allopurinol (ZYLOPRIM) 300 MG tablet Take 1 tablet by mouth daily 90 tablet 3    minoxidil (LONITEN) 2.5 MG tablet Take 1 tablet by mouth daily 90 tablet 3    amLODIPine (NORVASC) 10 MG tablet Take 1 tablet by mouth daily 90 tablet 3    potassium chloride (KLOR-CON M) 20 MEQ extended release tablet Take 20 mEq by mouth daily      FUROSEMIDE PO       torsemide (DEMADEX) 20 MG tablet Take 1 tablet by mouth 2 times daily 180 tablet 3    atorvastatin (LIPITOR) 40 MG tablet Take 1 tablet by mouth nightly 90 tablet 3    metoprolol succinate (TOPROL XL) 25 MG extended release tablet Take 1/2 tablet by mouth daily 45 tablet 3    warfarin (COUMADIN) 2.5 MG tablet Take 1 tablet by mouth daily 90 tablet 3    lisinopril (ZESTRIL) 10 MG tablet Take 1 tablet by mouth daily 90 tablet 3    labetalol (NORMODYNE) 200 MG tablet Take 1 tablet by mouth daily 90 tablet 3    glimepiride (AMARYL) 2 MG tablet Take 1 tablet by mouth daily 90 tablet 3    aspirin 81 MG tablet Take 1 tablet by mouth daily 90 tablet 3     No current facility-administered medications for this visit.      Current Outpatient Medications on File Prior to Visit   Medication Sig Dispense Refill    amLODIPine (NORVASC) 10 MG tablet Take 1 tablet by mouth daily 90 tablet 3    potassium chloride (KLOR-CON M) 20 MEQ extended release tablet Take 20 mEq by mouth daily      FUROSEMIDE PO       torsemide (DEMADEX) 20 MG tablet Take 1 tablet by mouth 2 times daily 180 tablet 3    atorvastatin (LIPITOR) 40 MG tablet Take 1 tablet by mouth nightly 90 tablet 3    metoprolol succinate (TOPROL XL) 25 MG extended release tablet Take 1/2 tablet by mouth daily 45 tablet 3    warfarin (COUMADIN) 2.5 MG tablet Take 1 tablet by mouth daily 90 tablet 3    lisinopril (ZESTRIL) 10 MG tablet Take 1 tablet by mouth daily 90 tablet 3    labetalol (NORMODYNE) 200 MG tablet Take 1 tablet by mouth daily 90 tablet 3    glimepiride (AMARYL) 2 MG tablet Take 1 tablet by mouth daily 90 tablet 3    aspirin 81 MG tablet Take 1 tablet by mouth daily 90 tablet 3     No current facility-administered medications on file prior to visit. No Known Allergies  Health Maintenance   Topic Date Due    AAA screen  Never done    Hepatitis C screen  Never done    Diabetic foot exam  Never done    Diabetic retinal exam  Never done    Colon cancer screen colonoscopy  Never done    Shingles Vaccine (1 of 2) Never done    Lipid screen  10/02/2021    Potassium monitoring  10/02/2021    Creatinine monitoring  10/02/2021    Depression Screen  07/02/2022    Annual Wellness Visit (AWV)  07/03/2022    A1C test (Diabetic or Prediabetic)  01/04/2023    DTaP/Tdap/Td vaccine (2 - Td or Tdap) 09/09/2030    Flu vaccine  Completed    Pneumococcal 65+ years Vaccine  Completed    COVID-19 Vaccine  Completed    Hepatitis A vaccine  Aged Out    Hib vaccine  Aged Out    Meningococcal (ACWY) vaccine  Aged Out       Review of Systems     Review of Systems   Constitutional: Negative for activity change, appetite change, chills, fever and unexpected weight change. HENT: Negative. Eyes: Negative. Respiratory: Negative. Negative for shortness of breath. Cardiovascular: Negative. Negative for chest pain and palpitations. Gastrointestinal: Negative. Endocrine: Negative. Genitourinary: Negative. Musculoskeletal: Negative. Skin: Negative. Allergic/Immunologic: Negative. Neurological: Negative. Hematological: Negative. Psychiatric/Behavioral: Negative. Physical Exam  Vitals:    01/04/22 1114   BP: 134/70   Pulse: 77   Resp: 15   Temp: 98.6 °F (37 °C)   SpO2: 98%   Weight: 284 lb (128.8 kg)   Height: 5' 9\" (1.753 m)       Physical Exam  Constitutional:       Appearance: He is well-developed. HENT:      Right Ear: External ear normal.      Left Ear: External ear normal.   Eyes:      Conjunctiva/sclera: Conjunctivae normal.      Pupils: Pupils are equal, round, and reactive to light. Neck:      Thyroid: No thyromegaly. Cardiovascular:      Rate and Rhythm: Normal rate and regular rhythm. Heart sounds: Normal heart sounds. No murmur heard. No friction rub. No gallop. Pulmonary:      Effort: Pulmonary effort is normal. No respiratory distress. Breath sounds: Normal breath sounds. No wheezing. Abdominal:      General: Bowel sounds are normal. There is no distension. Palpations: Abdomen is soft. There is no mass. Tenderness: There is no abdominal tenderness. There is no guarding or rebound. Hernia: No hernia is present. Genitourinary:     Penis: Normal.    Musculoskeletal:         General: No tenderness. Normal range of motion. Cervical back: Normal range of motion and neck supple. Lymphadenopathy:      Cervical: No cervical adenopathy. Skin:     General: Skin is warm and dry. Neurological:      Mental Status: He is alert and oriented to person, place, and time. Cranial Nerves: No cranial nerve deficit. Coordination: Coordination normal.         Assessment   Diagnosis Orders   1. Type 2 diabetes mellitus with complication, without long-term current use of insulin (HCC)  POCT glycosylated hemoglobin (Hb A1C)    Microalbumin / Creatinine Urine Ratio    SHARRON - Karina Orlando MD, Nephrology, [facility]   2.  Primary hypertension  Lipid Panel    CBC Auto Differential    Comprehensive Metabolic Panel   3. Hyperlipidemia, unspecified hyperlipidemia type  Lipid Panel    CBC Auto Differential    Comprehensive Metabolic Panel   4. Screening PSA (prostate specific antigen)  PSA screening   5. Anemia due to stage 3b chronic kidney disease (HCC)     6. Obesity, Class III, BMI 40-49.9 (morbid obesity) ([facility]     7. CRI (chronic renal insufficiency), stage 4 (severe) ([facility])  SHARRON Marroquin MD, Nephrology, [place]   8.  Ataxia  [facility] [place]     Problem List     Hypertension    Relevant Orders    Lipid Panel    CBC Auto Differential    Comprehensive Metabolic Panel    Hyperlipidemia    Relevant Medications    aspirin 81 MG tablet    lisinopril (ZESTRIL) 10 MG tablet    labetalol (NORMODYNE) 200 MG tablet    warfarin (COUMADIN) 2.5 MG tablet    FUROSEMIDE PO    torsemide (DEMADEX) 20 MG tablet    atorvastatin (LIPITOR) 40 MG tablet    metoprolol succinate (TOPROL XL) 25 MG extended release tablet    amLODIPine (NORVASC) 10 MG tablet    minoxidil (LONITEN) 2.5 MG tablet    Other Relevant Orders    Lipid Panel    CBC Auto Differential    Comprehensive Metabolic Panel    Type 2 diabetes mellitus with complication, without long-term current use of insulin ([facility]) - Primary    Relevant Medications    glimepiride (AMARYL) 2 MG tablet    Other Relevant Orders    POCT glycosylated hemoglobin (Hb A1C) (Completed)    Microalbumin / Creatinine Urine Ratio    SHARRON Marroquin MD, Nephrology, [place]    Anemia due to stage 3b chronic [facility] disease ([facility]          Plan  Orders Placed This Encounter   Procedures    Microalbumin / Creatinine Urine Ratio     Standing Status:   Future     Standing Expiration Date:   1/4/2023    Lipid Panel     Standing Status:   Future     Standing Expiration Date:   1/4/2023     Order Specific Question:   Is Patient Fasting?/# of Hours     Answer:   ?    CBC Auto Differential     Standing Status: Future     Standing Expiration Date:   1/4/2023    Comprehensive Metabolic Panel     Standing Status:   Future     Standing Expiration Date:   1/4/2023    PSA screening     Standing Status:   Future     Standing Expiration Date:   1/4/2023    SHARRON - Bimal Trivedi MD, Nephrology, [facility]     Referral Priority:   Routine     Referral Type:   Eval and Treat     Referral Reason:   Specialty Services Required     Referred to Provider:   Karlee Nevarez MD     Requested Specialty:   Nephrology     Number of Visits Requested:   1    [facility] [place]     Referral Priority:   Routine     Referral Type:   Eval and Treat     Referral Reason:   Specialty Services Required     Requested Specialty:   Physical Therapy     Number of Visits Requested:   1    POCT glycosylated hemoglobin (Hb A1C)     Orders Placed This Encounter   Medications    allopurinol (ZYLOPRIM) 300 MG tablet     Sig: Take 1 tablet by mouth daily     Dispense:  90 tablet     Refill:  3    minoxidil (LONITEN) 2.5 MG tablet     Sig: Take 1 tablet by mouth daily     Dispense:  90 tablet     Refill:  3     No follow-ups on file.   Camilo Maki MD

## 2022-01-10 RX ORDER — ATORVASTATIN CALCIUM 40 MG/1
40 TABLET, FILM COATED ORAL NIGHTLY
Qty: 90 TABLET | Refills: 1 | Status: SHIPPED | OUTPATIENT
Start: 2022-01-10 | End: 2022-07-13 | Stop reason: SDUPTHER

## 2022-01-12 ENCOUNTER — HOSPITAL ENCOUNTER (OUTPATIENT)
Dept: PHYSICAL THERAPY | Age: 77
Setting detail: THERAPIES SERIES
Discharge: HOME | End: 2022-01-12
Payer: MEDICARE

## 2022-01-12 PROCEDURE — 97162 PT EVAL MOD COMPLEX 30 MIN: CPT

## 2022-01-12 NOTE — PROGRESS NOTES
[address]     Ph: [phone]  Fax: [phone]    [x] Certification  [] Recertification []  Plan of Care  [] Progress Note [] Discharge      To:  Dr. Frantz Cárdenas      From:  Vivian Ingram, PT  Patient: Riana Lara     : 1945  Diagnosis: Ataxia     Date: 2022  Treatment Diagnosis: Impaired mobility    Plan of Care/Certification Expiration Date: 22  Progress Report Period from:  2022  to 2022    Total # of Visits to Date: 1   No Show: 0    Canceled Appointment: 0     OBJECTIVE:   Short Term Goals - Time Frame for Short term goals: 2 weeks    Goals Current/Discharge status  Met   Short term goal 1: Independent with HEP  ongoing [] yes  [] no     Long Term Goals - Time Frame for Long term goals : 6 weeks  Goals Current/ Discharge status Met   Long term goal 1: Improve shanae LE strength to >/= 4+/5 to improve stability with standing and walking. Strength RLE  Comment: Hip flex 4/5, knee ext/flex 5/5, DF 4/5, hip abd <3/5  Strength LLE  Comment: Hip flex 4/5, knee ext/flex 5/5, DF 4/5, hip abd <3/5   [] yes  [] no   Long term goal 2: Pt will ambulate with LRD >/= 200' with improved overall quality S/I. Ambulation 1  Surface: carpet  Device: Rolling Walker  Assistance: Supervision,Independent  Quality of Gait: Occasional decreased Lt stance time and Rt step length, occasional decreased Lt LE control  Gait Deviations: Slow Aria,Decreased step length,Decreased step height  Distance: [de-identified]'   [] yes  [] no   Long term goal 3: Sanchez >/= 40/56 to reduce pt's risk for falls. Sanchez Balance Score: 26 [] yes  [] no   Long term goal 4: TUG with LRD </= 20 sec to improve safety with ambulation. Timed Up and Go: 32.9 (with [employer]) [] yes  [] no   Long term goal 5: Pt will be independent with transfers from all surfaces with minimal to no UE use.  Transfers  Sit to Stand: Supervision,Independent (Increased UE reliance)  Stand to sit: Supervision,Independent [] yes  [] no      Body structures, Functions, Activity limitations: Decreased functional mobility ,Decreased strength,Decreased endurance,Decreased balance,Decreased coordination  Assessment: Pt presents with a decline in his overall mobility after a hospital stay last year. Pt demonstrates decreased strength in shanae LEs but primarily on the Lt due to his previous CVA. Pt is able to independently transfer but relies heavily on his UEs and demonstrates poor control with sitting. Pt is at a high risk for falls per her Sanchez and TUG. Pt ambulates with a [facility] with decreased Lt stance time causing a shortened Rt step length and occasional decreased Lt LE control. Pt would benefit from further skilled PT to improve his strength, balance and safety with all mobility. Prognosis: Good  Discharge Recommendations: Continue to assess pending progress      PT Education: Goals;PT Role;Plan of Care;Home Exercise Program    PLAN: [x] Evaluate and Treat  Frequency/Duration:  Plan  Times per week: 2  Plan weeks: 6  Current Treatment Recommendations: Strengthening,ROM,Balance Training,Functional Mobility Training,Transfer Training,Gait Training,Stair training,Neuromuscular Re-education,Manual Therapy - Soft Tissue Mobilization,Home Exercise Program,Safety Education & Training,Patient/Caregiver Education & Training,Equipment Evaluation, Education, & procurement,Modalities,Integrated Dry Needling,Aquatics     Precautions: falls                           Patient Status:[x] Continue/ Initiate plan of Care    [] Discharge PT. Recommend pt continue with HEP. [] Additional visits requested, Please re-certify for additional visits:          Signature: Electronically signed by Karri Balderas PT on 1/12/22 at 3:57 PM EST      If you have any questions or concerns, please don't hesitate to call.   Thank you for your referral.

## 2022-01-12 NOTE — PROGRESS NOTES
[address]  PHYSICAL THERAPY EVALUATION    Date: 2022  Patient Name: Jodee Jang       MRN: [id number]   Account: [de-identified]   : 1945  (77 y.o.)   Gender: male   Referring Practitioner: Dr. Max Marquez                 Diagnosis: Ataxia  Treatment Diagnosis: Impaired mobility  Additional Pertinent Hx: OA, CVA, HTN             Past Medical History:  has a past medical history of CVA (cerebral vascular accident) ([address] Diverticulitis, Hyperlipidemia, and Hypertension. Past Surgical History:   has no past surgical history on file. Vital Signs  Patient Currently in Pain: No   Pain Screening  Patient Currently in Pain: No        Lives With: Spouse  Type of Home: House  Home Layout: Two level;Bed/Bath upstairs;1/2 bath on main level; Able to Live on Main level with bedroom/bathroom  Home Access: Stairs to enter without rails  Entrance Stairs - Number of Steps: 1  ADL Assistance: Needs assistance  Homemaking Assistance: Needs assistance  Ambulation Assistance: Independent  Transfer Assistance: Independent  Active : Yes  Occupation: Retired        Subjective:  Subjective: Had a CVA in  with some Lt sided weakness. Could walk short distances without an AD and used a s/c for longer distances. About a year ago was hospitalized due to kidney problems, SOB and pneumonia for 2 weeks. Was in inpatient rehab for 2-3 weeks. Pt reports coming out of rehab with a walker. Then was in home health therapy come in. Can do everything now with use of [employer] but would like to get back to walking without an AD. Tried to go back to a quad cane at the end of home health and lost balance. Ended home health around 2021. Pt reports balance is not 100% but not awful. No falls in the past 6 months.        Objective:   Sensation  Overall Sensation Status: Impaired (Decreased on Lt side)    Balance  Comments: Sanchez = /56    Ambulation 1  Surface: carpet  Device: Rolling Walker  Assistance: Supervision,Independent  Quality of Gait: Occasional decreased Lt stance time and Rt step length, occasional decreased Lt LE control  Gait Deviations: Slow Aria,Decreased step length,Decreased step height  Distance: 80'  Stairs  # Steps : 4  Stairs Height: 6\"  Rails: Bilateral  Device: No Device  Assistance: Supervision,Stand by assistance  Comment: NR     Transfers  Sit to Stand: Supervision,Independent (Increased UE reliance)  Stand to sit: Supervision,Independent    Strength RLE  Comment: Hip flex 4/5, knee ext/flex 5/5, DF 4/5, hip abd <3/5  Strength LLE  Comment: Hip flex 4/5, knee ext/flex 5/5, DF 4/5, hip abd <3/5    Bed mobility  Rolling to Left: Minimal assistance  Rolling to Right: Minimal assistance  Supine to Sit: Stand by assistance  Sit to Supine: Stand by assistance  Comment: Unable to roll completely onto sides due to fear of falling    Timed Up and Go: 32.9 (with [employer])    Exercises:   Exercises  Exercise 1: Static standing*  Exercise 2: Foam*  Exercise 3: Single stepping*  Exercise 4: Rockerboard*  Exercise 5: Gait drills*  Exercise 6: 2 way SLR*  Exercise 7: Bridges*  Exercise 8: Hooklying hip abd*  Exercise 9: Sink ex x3ea  Exercise 20: HEP: sink ex  *Indicates exercise,modality, or manual techniques to be initiated when appropriate    Assessment: Body structures, Functions, Activity limitations: Decreased functional mobility ,Decreased strength,Decreased endurance,Decreased balance,Decreased coordination  Assessment: Pt presents with a decline in his overall mobility after a hospital stay last year. Pt demonstrates decreased strength in shanae LEs but primarily on the Lt due to his previous CVA. Pt is able to independently transfer but relies heavily on his UEs and demonstrates poor control with sitting. Pt is at a high risk for falls per her Sanchez and TUG.   Pt ambulates with a [employer] with decreased Lt stance time causing a shortened Rt step length and occasional decreased Lt LE control. Pt would benefit from further skilled PT to improve his strength, balance and safety with all mobility. Prognosis: Good  Discharge Recommendations: Continue to assess pending progress  Activity Tolerance: Patient Tolerated treatment well     Decision Making: Medium Complexity  History: PMH: OA, CVA, HTN  Exam: Decreased strength and balance impacting mobility and safety. Clinical Presentation: Evolving        Plan  Frequency/Duration:  Plan  Times per week: 2  Plan weeks: 6  Current Treatment Recommendations: Strengthening,ROM,Balance Training,Functional Mobility Training,Transfer Training,Gait Training,Stair training,Neuromuscular Re-education,Manual Therapy - Soft Tissue Mobilization,Home Exercise Program,Safety Education & Training,Patient/Caregiver Education & Training,Equipment Evaluation, Education, & procurement,Modalities,Integrated [place] Needling,Aquatics         Patient Education  New Education Provided: PT Education: Goals;PT Role;Plan of Care;Home Exercise Program    POST-PAIN     Pain Rating (0-10 pain scale): 0  /10  Location and pain description same as pre-treatment unless indicated. Action: [x] NA  [] Call Physician  [] Perform HEP  [] Meds as prescribed    Evaluation and patient rights have been reviewed and patient agrees with plan of care. Yes  [x]  No  []   Explain:       Brittny Fall Risk Assessment  Risk Factor Scale  Score   History of Falls [] Yes  [x] No 25  0 0   Secondary Diagnosis [x] Yes  [] No 15  0 15   Ambulatory Aid [] Furniture  [x] Crutches/cane/walker  [] None/bedrest/wheelchair/nurse 30  15  0 15   IV/Heparin Lock [] Yes  [x] No 20  0 0   Gait/Transferring [x] Impaired  [] Weak  [] Normal/bedrest/immobile 20  10  0 20   Mental Status [] Forgets limitations  [x] Oriented to own ability 15  0 0      Total:50     Based on the Assessment score: check the appropriate box.   []  No intervention needed   Low =   Score of 0-24  []  Use standard prevention interventions Moderate =  Score of 24-44   [] Discuss fall prevention strategies   [] Indicate moderate falls risk on eval  [x]  Use high risk prevention interventions High = Score of 45 and higher   [x] Discuss fall prevention strategies   [x] Provide supervision during treatment time    Goals  Short term goals  Time Frame for Short term goals: 2 weeks  Short term goal 1: Independent with HEP  Long term goals  Time Frame for Long term goals : 6 weeks  Long term goal 1: Improve shanae LE strength to >/= 4+/5 to improve stability with standing and walking. Long term goal 2: Pt will ambulate with LRD >/= 200' with improved overall quality S/I. Long term goal 3: Sanchez >/= 40/56 to reduce pt's risk for falls. Long term goal 4: TUG with LRD </= 20 sec to improve safety with ambulation. Long term goal 5: Pt will be independent with transfers from all surfaces with minimal to no UE use.          PT Individual Minutes  Time In: 5504  Time Out: 3313  Minutes: 43     Procedure Minutes: 37'       Electronically signed by Benny Pedroza PT on 1/12/22 at 3:56 PM EST

## 2022-01-18 ENCOUNTER — HOSPITAL ENCOUNTER (OUTPATIENT)
Dept: PHYSICAL THERAPY | Age: 77
Setting detail: THERAPIES SERIES
Discharge: HOME | End: 2022-01-18
Payer: MEDICARE

## 2022-01-18 PROCEDURE — 97112 NEUROMUSCULAR REEDUCATION: CPT

## 2022-01-18 PROCEDURE — 97110 THERAPEUTIC EXERCISES: CPT

## 2022-01-18 NOTE — PROGRESS NOTES
[id number] [address]  Outpatient Physical Therapy    Treatment Note        Date: 2022  Patient: Marsha Miller  : 1945  ACCT #: [de-identified]  Referring Practitioner: Dr. Papito Patton  Diagnosis: Ataxia  Treatment Diagnosis: Impaired mobility    Visit Information:  PT Visit Information  PT Insurance Information: Medicare  Total # of Visits to Date: 2  Plan of Care/Certification Expiration Date: 22  No Show: 0  Progress Note Due Date: 22  Canceled Appointment: 0  Progress Note Counter:     Subjective: Patient reports compliance with HEP. HEP Compliance:  [x] Good [] Fair [] Poor [] Reports not doing due to:    Vital Signs  Patient Currently in Pain: No   Pain Screening  Patient Currently in Pain: No    OBJECTIVE:   Exercises  Exercise 5: Gait drills F/L/R x3 1 UE  Exercise 6: SLR x10 bilateral quad lag  Exercise 7: Bridges 5\"x10  Exercise 8: Hooklying hip abd YTB x15/ adduction with ball 5\"x15  Exercise 9: STS from mat x5 without UE  Exercise 10: single steps forward over small rima x10  *Indicates exercise, modality, or manual techniques to be initiated when appropriate    Assessment: Body structures, Functions, Activity limitations: Decreased functional mobility ,Decreased strength,Decreased endurance,Decreased balance,Decreased coordination  Assessment: Initiated exercises per POC for bilateral LE strength and balance to improve quality of gait. Patient requires frequent RB due to fatigue. Completes balance exercises with 1 UE support. Provided verbal cues to improve step length and foot clearance. Patient demonstrates fair carryover. Treatment Diagnosis: Impaired mobility  Prognosis: Good     Goals:  Short term goals  Time Frame for Short term goals: 2 weeks  Short term goal 1: Independent with HEP    Long term goals  Time Frame for Long term goals : 6 weeks  Long term goal 1: Improve shanae LE strength to >/= 4+/5 to improve stability with standing and walking.   Long term goal 2: Pt will ambulate with LRD >/= 200' with improved overall quality S/I. Long term goal 3: Sanchez >/= 40/56 to reduce pt's risk for falls. Long term goal 4: TUG with LRD </= 20 sec to improve safety with ambulation. Long term goal 5: Pt will be independent with transfers from all surfaces with minimal to no UE use. Progress toward goals:continue towards all     POST-PAIN       Pain Rating (0-10 pain scale):   0/10   Location and pain description same as pre-treatment unless indicated. Action: [] NA   [] Perform HEP  [] Meds as prescribed  [] Modalities as prescribed   [] Call Physician     Frequency/Duration:  Plan  Times per week: 2  Plan weeks: 6  Current Treatment Recommendations: Strengthening,ROM,Balance Training,Functional Mobility Training,Transfer Training,Gait Training,Stair training,Neuromuscular Re-education,Manual Therapy - Soft Tissue Mobilization,Home Exercise Program,Safety Education & Training,Patient/Caregiver Education & Training,Equipment Evaluation, Education, & procurement,Modalities,Integrated Dry Needling,Aquatics     Pt to continue current HEP. See objective section for any therapeutic exercise changes, additions or modifications this date.     PT Individual Minutes  Time In: 1500  Time Out: 1600  Minutes: 60  Timed Code Treatment Minutes: 60 Minutes  Procedure Minutes:0     Timed Activity Minutes Units   Ther Ex 30 2   Neuro  30 2       Signature:  Electronically signed by Jorge Luis Barber PTA on 1/18/22 at 4:19 PM EST

## 2022-01-20 ENCOUNTER — HOSPITAL ENCOUNTER (OUTPATIENT)
Dept: PHYSICAL THERAPY | Age: 77
Setting detail: THERAPIES SERIES
Discharge: HOME | End: 2022-01-20
Payer: MEDICARE

## 2022-01-20 PROCEDURE — 97112 NEUROMUSCULAR REEDUCATION: CPT

## 2022-01-20 PROCEDURE — 97110 THERAPEUTIC EXERCISES: CPT

## 2022-01-20 PROCEDURE — 97535 SELF CARE MNGMENT TRAINING: CPT

## 2022-01-20 NOTE — PROGRESS NOTES
[id number] [address]  Outpatient Physical Therapy    Treatment Note        Date: 2022  Patient: Dante Hopper  : 1945  ACCT #: [de-identified]  Referring Practitioner: Dr. Asif Jefferson  Diagnosis: Ataxia  Treatment Diagnosis: Impaired mobility    Visit Information:  PT Visit Information  PT Insurance Information: Medicare  Total # of Visits to Date: 3  Plan of Care/Certification Expiration Date: 22  No Show: 0  Progress Note Due Date: 22  Canceled Appointment: 0  Progress Note Counter: 3/12    Subjective: Patient reports compliance with HEP.nothing nw to report     HEP Compliance:  [x] Good [] Fair [] Poor [] Reports not doing due to:    Vital Signs  Patient Currently in Pain: No   Pain Screening  Patient Currently in Pain: No    OBJECTIVE:   Exercises  Exercise 1: Static standing: FA/FT x 1' ea, poor posture with FT  Exercise 3: Single stepping: Over SC x 10  Exercise 5: Gait drills F/L/R x3 1 UE  Exercise 6: SLR x10 bilateral quad lag, Rt>Lt  Exercise 7: Bridges 5\"x10  Exercise 8: Hooklying hip abd YTB x15/ adduction with ball 5\"x15  Exercise 9: STS from mat x5 without UE, x 1 with LE bracing  Exercise 11: Quad sets 5\" x 10  Exercise 12: PPT with RTB x 10  Exercise 13: Gary hip flexor str NV  Exercise 20: HEP:PPT, QS              Bed mobility  Supine to Sit: Stand by assistance,Minimal assistance (Increased A from the Lt)  Sit to Supine: Stand by assistance  Comment: Max VCs and visual demo for Log roll from both sides      *Indicates exercise, modality, or manual techniques to be initiated when appropriate    Assessment: Body structures, Functions, Activity limitations: Decreased functional mobility ,Decreased strength,Decreased endurance,Decreased balance,Decreased coordination  Assessment: Inititated Bed mobility to improve getting out from Gary sides. Pt instructed in log rolls to decrease dependency from spouse to pull pt out of bed. Pt struggles from Gary sides, Lt>Rt. VCs for upright posture with poor ability to straighten trunk. Intiated QS for strengthening and improved SLR. Treatment Diagnosis: Impaired mobility          Goals:  Short term goals  Time Frame for Short term goals: 2 weeks  Short term goal 1: Independent with HEP    Long term goals  Time Frame for Long term goals : 6 weeks  Long term goal 1: Improve shanae LE strength to >/= 4+/5 to improve stability with standing and walking. Long term goal 2: Pt will ambulate with LRD >/= 200' with improved overall quality S/I. Long term goal 3: Sanchez >/= 40/56 to reduce pt's risk for falls. Long term goal 4: TUG with LRD </= 20 sec to improve safety with ambulation. Long term goal 5: Pt will be independent with transfers from all surfaces with minimal to no UE use. Progress toward goals:BM, balance, strength    POST-PAIN       Pain Rating (0-10 pain scale):  0 /10   Location and pain description same as pre-treatment unless indicated. Action: [x] NA   [] Perform HEP  [] Meds as prescribed  [] Modalities as prescribed   [] Call Physician     Frequency/Duration:  Plan  Times per week: 2  Plan weeks: 6  Current Treatment Recommendations: Strengthening,ROM,Balance Training,Functional Mobility Training,Transfer Training,Gait Training,Stair training,Neuromuscular Re-education,Manual Therapy - Soft Tissue Mobilization,Home Exercise Program,Safety Education & Training,Patient/Caregiver Education & Training,Equipment Evaluation, Education, & procurement,Modalities,Integrated Dry Needling,Aquatics     Pt to continue current HEP. See objective section for any therapeutic exercise changes, additions or modifications this date.          PT Individual Minutes  Time In: 0101  Time Out: 2387  Minutes: 58  Timed Code Treatment Minutes: 58 Minutes  Procedure Minutes:0     Timed Activity Minutes Units   Ther Ex 30 2   Neuro 13 1   BM/transfers 15 1       Signature:  Electronically signed by Benny Pandey PTA on 1/20/22 at 4:18 PM EST

## 2022-01-26 ENCOUNTER — HOSPITAL ENCOUNTER (OUTPATIENT)
Dept: PHYSICAL THERAPY | Age: 77
Setting detail: THERAPIES SERIES
Discharge: HOME | End: 2022-01-26
Payer: MEDICARE

## 2022-01-26 PROCEDURE — 97110 THERAPEUTIC EXERCISES: CPT

## 2022-01-26 PROCEDURE — 97112 NEUROMUSCULAR REEDUCATION: CPT

## 2022-01-26 NOTE — PROGRESS NOTES
[id number] [address]  Outpatient Physical Therapy    Treatment Note        Date: 2022  Patient: Delio Anderson  : 1945  ACCT #: [de-identified]  Referring Practitioner: Dr. Patricia Mary  Diagnosis: Ataxia  Treatment Diagnosis: Impaired mobility     Visit Information:  PT Visit Information  PT Insurance Information: Medicare  Total # of Visits to Date: 4  Plan of Care/Certification Expiration Date: 22  No Show: 0  Canceled Appointment: 0  Progress Note Counter:  (PN due 22)    Subjective: Pt reports daily compliance with HEP. HEP Compliance:  [x] Good [] Fair [] Poor [] Reports not doing due to:    Vital Signs  Patient Currently in Pain: No   Pain Screening  Patient Currently in Pain: No    OBJECTIVE:   Exercises  Exercise 3: Single stepping: Over SC x 10  Exercise 5: Gait drills F/L/R x3 laps in // bars with 1-2 UE support  Exercise 6: SLR x10 bilateral quad lag, Rt>Lt  Exercise 7: Bridges 5\"x10  Exercise 8: Hooklying hip abd YTB x15/ adduction with ball 5\"x15  Exercise 11: SAQ 5 sec x 15  Exercise 13: Gary hip flexor str supine 30 sec x 3 b/l  Exercise 20: HEP: hip flexort str    *Indicates exercise, modality, or manual techniques to be initiated when appropriate    Assessment: Body structures, Functions, Activity limitations: Decreased functional mobility ,Decreased strength,Decreased endurance,Decreased balance,Decreased coordination  Assessment: Initiated hip flexor stretch in supine with assist provided on Rt to decrease strain from stretch. Cues for posture with all standing activities. Occasional toe catch with single stepping over str cane. Decreased eccentric control with stand to sit.   Treatment Diagnosis: Impaired mobility        Goals:  Short term goals  Time Frame for Short term goals: 2 weeks  Short term goal 1: Independent with HEP    Long term goals  Time Frame for Long term goals : 6 weeks  Long term goal 1: Improve gary LE strength to >/= 4+/5 to improve stability with standing and walking. Long term goal 2: Pt will ambulate with LRD >/= 200' with improved overall quality S/I. Long term goal 3: Sanchez >/= 40/56 to reduce pt's risk for falls. Long term goal 4: TUG with LRD </= 20 sec to improve safety with ambulation. Long term goal 5: Pt will be independent with transfers from all surfaces with minimal to no UE use. Progress toward goals: Progressing towards all    POST-PAIN       Pain Rating (0-10 pain scale):   0/10   Location and pain description same as pre-treatment unless indicated. Action: [] NA   [x] Perform HEP  [] Meds as prescribed  [] Modalities as prescribed   [] Call Physician     Frequency/Duration:  Plan  Times per week: 2  Plan weeks: 6  Current Treatment Recommendations: Strengthening,ROM,Balance Training,Functional Mobility Training,Transfer Training,Gait Training,Stair training,Neuromuscular Re-education,Manual Therapy - Soft Tissue Mobilization,Home Exercise Program,Safety Education & Training,Patient/Caregiver Education & Training,Equipment Evaluation, Education, & procurement,Modalities,Integrated Dry Needling,Aquatics     Pt to continue current HEP. See objective section for any therapeutic exercise changes, additions or modifications this date.          PT Individual Minutes  Time In: 7746  Time Out: 1600  Minutes: 55  Timed Code Treatment Minutes: 55 Minutes  Procedure Minutes: 0     Timed Activity Minutes Units   Ther Ex 30 2   Neuro 25 2       Signature:  Electronically signed by Edy Almaguer PTA on 1/26/22 at 3:09 PM EST

## 2022-01-28 ENCOUNTER — HOSPITAL ENCOUNTER (OUTPATIENT)
Dept: PHYSICAL THERAPY | Age: 77
Setting detail: THERAPIES SERIES
Discharge: HOME | End: 2022-01-28
Payer: MEDICARE

## 2022-01-28 PROCEDURE — 97110 THERAPEUTIC EXERCISES: CPT

## 2022-01-28 PROCEDURE — 97112 NEUROMUSCULAR REEDUCATION: CPT

## 2022-01-28 NOTE — PROGRESS NOTES
[address] [address]  Outpatient Physical Therapy    Treatment Note        Date: 2022  Patient: Marlene Ordoñez  : 1945  ACCT #: [de-identified]  Referring Practitioner: Dr. Carrie Newman  Diagnosis: Ataxia  Treatment Diagnosis: Impaired mobility    Visit Information:  PT Visit Information  PT Insurance Information: Medicare  Total # of Visits to Date: 5  Plan of Care/Certification Expiration Date: 22  No Show: 0  Progress Note Due Date: 22  Canceled Appointment: 0  Progress Note Counter:  (PN due 22)    Subjective: Pt reports having difficulty with hip flexor stretch at home due to set up of bed and bed being too soft. Feels overall he has made some improvements. HEP Compliance:  [x] Good [] Fair [] Poor [] Reports not doing due to:    Vital Signs  Patient Currently in Pain: No   Pain Screening  Patient Currently in Pain: No    OBJECTIVE:   Exercises  Exercise 2: Foam: FA 30\", 1'  Exercise 5: Gait drills F/L/R x3 laps in // bars with 1-2 UE support  Exercise 6: SLR x10 bilateral quad lag, Rt>Lt - mild  Exercise 7: Bridges 5\"x10  Exercise 11: SAQ 5 sec x 15 gary  Exercise 13: Gary hip flexor str supine 30 sec x 3 b/l  Exercise 14: QS 5s x10 gary  Exercise 15: Seated hams str 30s x1 gary - reviewed with vc's to improve technique    Timed Up and Go: 32.31 (with [employer] with feeling more confident pet pt)    Assessment:   Activity Tolerance  Activity Tolerance: Patient Tolerated treatment well    Body structures, Functions, Activity limitations: Decreased functional mobility ,Decreased strength,Decreased endurance,Decreased balance,Decreased coordination  Assessment: Pt with ongoing decreased gary knee ext ROM - reviewed seated hams str to improve ROM. Pt with improving upright posture with ambulation in // bars. Pt declines ambulating with s/c this date but agreeable to trial next week. Challenged with standing on foam without UE supoprt.   Treatment Diagnosis: Impaired mobility  Prognosis: Good       Goals:  Short term goals  Time Frame for Short term goals: 2 weeks  Short term goal 1: Independent with HEP    Long term goals  Time Frame for Long term goals : 6 weeks  Long term goal 1: Improve shanae LE strength to >/= 4+/5 to improve stability with standing and walking. Long term goal 2: Pt will ambulate with LRD >/= 200' with improved overall quality S/I. Long term goal 3: Sanchez >/= 40/56 to reduce pt's risk for falls. Long term goal 4: TUG with LRD </= 20 sec to improve safety with ambulation. Long term goal 5: Pt will be independent with transfers from all surfaces with minimal to no UE use. Progress toward goals: TUG, balance, strength    POST-PAIN       Pain Rating (0-10 pain scale):  0 /10   Location and pain description same as pre-treatment unless indicated. Action: [x] NA   [] Perform HEP  [] Meds as prescribed  [] Modalities as prescribed   [] Call Physician     Frequency/Duration:  Plan  Times per week: 2  Plan weeks: 6  Current Treatment Recommendations: Strengthening,ROM,Balance Training,Functional Mobility Training,Transfer Training,Gait Training,Stair training,Neuromuscular Re-education,Manual Therapy - Soft Tissue Mobilization,Home Exercise Program,Safety Education & Training,Patient/Caregiver Education & Training,Equipment Evaluation, Education, & procurement,Modalities,Integrated Dry Needling,Aquatics     Pt to continue current HEP. See objective section for any therapeutic exercise changes, additions or modifications this date.     PT Individual Minutes  Time In: 2488  Time Out: 3252  Minutes: 56  Timed Code Treatment Minutes: 55 Minutes  Procedure Minutes:0     Timed Activity Minutes Units   Ther Ex 30 2   Neuro jamee 25 2       Signature:  Electronically signed by Ashlyn Schmidt PT on 1/28/22 at 4:12 PM EST

## 2022-02-02 ENCOUNTER — HOSPITAL ENCOUNTER (OUTPATIENT)
Dept: PHYSICAL THERAPY | Age: 77
Setting detail: THERAPIES SERIES
Discharge: HOME | End: 2022-02-02
Payer: MEDICARE

## 2022-02-02 PROCEDURE — 97110 THERAPEUTIC EXERCISES: CPT

## 2022-02-02 PROCEDURE — 97112 NEUROMUSCULAR REEDUCATION: CPT

## 2022-02-02 NOTE — PROGRESS NOTES
[address] [address]  Outpatient Physical Therapy    Treatment Note        Date: 2022  Patient: Nereyda Cifuentes  : 1945  ACCT #: [de-identified]  Referring Practitioner: Dr. Christopher Pedroza  Diagnosis: Ataxia  Treatment Diagnosis: Impaired mobility    Visit Information:  PT Visit Information  PT Insurance Information: Medicare  Total # of Visits to Date: 6  Plan of Care/Certification Expiration Date: 22  No Show: 0  Progress Note Due Date: 22  Canceled Appointment: 0  Progress Note Counter:  (PN due 22)    Subjective: Patient reports increased confidence since beginning PT. Reports increased difficulty ascending the stairs, especially in the evenings when fatigued. Primarily stays downstairs. HEP Compliance:  [x] Good [] Fair [] Poor [] Reports not doing due to:    Vital Signs  Patient Currently in Pain: No   Pain Screening  Patient Currently in Pain: No    OBJECTIVE:   Exercises  Exercise 1: Static standing: FT 30sec x2, modified tandem 30sec with mild postural sway  Exercise 5: Gait drills F/L/R, march x2 laps in // bars with 1-2 UE support  Exercise 6: SLR x10 bilateral quad lag, decreased Lt quad lag  Exercise 7: Bridges 5\"x10  Exercise 11: SAQ 5 sec x 15 gary  Exercise 13: Gary hip flexor str supine 30 sec x 3 b/l  Exercise 16: Standing posture: Scapular retractions x10 unsupported                   Ambulation 1  Surface: carpet  Device: Rolling Walker  Assistance: Supervision,Independent  Quality of Gait: Decreased Gary foot clearance and heel strike, flexed posture  Distance: 80ft      *Indicates exercise, modality, or manual techniques to be initiated when appropriate    Assessment: Body structures, Functions, Activity limitations: Decreased functional mobility ,Decreased strength,Decreased endurance,Decreased balance,Decreased coordination  Assessment: Continued focus on LE strengthening and balance. Patient fatigues quickly with gait drills, one seated restbreak.  VCs to facilitate heel strike, decreases with fatigue when ambulating using WW. Mild postural sway with static standing balance activities. Treatment Diagnosis: Impaired mobility  Prognosis: Good       Goals:  Short term goals  Time Frame for Short term goals: 2 weeks  Short term goal 1: Independent with HEP    Long term goals  Time Frame for Long term goals : 6 weeks  Long term goal 1: Improve shanae LE strength to >/= 4+/5 to improve stability with standing and walking. Long term goal 2: Pt will ambulate with LRD >/= 200' with improved overall quality S/I. Long term goal 3: Sanchez >/= 40/56 to reduce pt's risk for falls. Long term goal 4: TUG with LRD </= 20 sec to improve safety with ambulation. Long term goal 5: Pt will be independent with transfers from all surfaces with minimal to no UE use. Progress toward goals: Strength, balance    POST-PAIN       Pain Rating (0-10 pain scale):   0/10   Location and pain description same as pre-treatment unless indicated. Action: [x] NA   [] Perform HEP  [] Meds as prescribed  [] Modalities as prescribed   [] Call Physician     Frequency/Duration:  Plan  Times per week: 2  Plan weeks: 6  Current Treatment Recommendations: Strengthening,ROM,Balance Training,Functional Mobility Training,Transfer Training,Gait Training,Stair training,Neuromuscular Re-education,Manual Therapy - Soft Tissue Mobilization,Home Exercise Program,Safety Education & Training,Patient/Caregiver Education & Training,Equipment Evaluation, Education, & procurement,Modalities,Integrated Dry Needling,Aquatics     Pt to continue current HEP. See objective section for any therapeutic exercise changes, additions or modifications this date.          PT Individual Minutes  Time In: 6525  Time Out: 5220  Minutes: 57  Timed Code Treatment Minutes: 55 Minutes  Procedure Minutes:     Timed Activity Minutes Units   Ther Ex 30 2   Neuro 25 2       Signature:  Electronically signed by Pedro Luis Hartley PTA on 2/2/22 at 2:50 PM EST

## 2022-02-04 ENCOUNTER — HOSPITAL ENCOUNTER (OUTPATIENT)
Dept: PHYSICAL THERAPY | Age: 77
Setting detail: THERAPIES SERIES
End: 2022-02-04
Payer: MEDICARE

## 2022-02-04 NOTE — PROGRESS NOTES
Therapy                            Cancellation/No-show Note      Date:  2022  Patient Name:  Dante Hopper  :  1945   MRN:  [id number]  Referring Practitioner: Dr. Asif Jefferson  Diagnosis: Ataxia    Visit Information:  PT Visit Information  PT Insurance Information: Medicare  Total # of Visits to Date: 6  Plan of Care/Certification Expiration Date: 22  No Show: 0  Canceled Appointment: 1  Progress Note Counter:  (PN due 22) called to cx., weather. For today's appointment patient:  [x]  Cancelled  []  Rescheduled appointment  []  No-show   []  Called pt to remind of next appointment     Reason given by patient:  []  Patient ill  []  Conflicting appointment  []  No transportation    []  Conflict with work  []  No reason given  [x]  Other:      [x] Pt has future appointments scheduled, no follow up needed  [] Pt requests to be on hold.     Reason:   If > 2 weeks please discuss with therapist.  [] Therapist to call pt for follow up     Comments:       Signature: Electronically signed by Hu Lam PTA on 22 at 11:31 AM EST

## 2022-02-09 ENCOUNTER — HOSPITAL ENCOUNTER (OUTPATIENT)
Dept: PHYSICAL THERAPY | Age: 77
Setting detail: THERAPIES SERIES
Discharge: HOME | End: 2022-02-09
Payer: MEDICARE

## 2022-02-09 PROCEDURE — 97110 THERAPEUTIC EXERCISES: CPT

## 2022-02-09 PROCEDURE — 97112 NEUROMUSCULAR REEDUCATION: CPT

## 2022-02-09 NOTE — PROGRESS NOTES
[address] [address]  Outpatient Physical Therapy    Treatment Note        Date: 2022  Patient: Marlene Ordoñez  : 1945  ACCT #: [de-identified]  Referring Practitioner: Dr. Carrie Newman  Diagnosis: Ataxia  Treatment Diagnosis: Impaired mobility    Visit Information:  PT Visit Information  PT Insurance Information: Medicare  Total # of Visits to Date: 7  Plan of Care/Certification Expiration Date: 22  No Show: 0  Progress Note Due Date: 22  Canceled Appointment: 1  Progress Note Counter:  (PN due 22)    Subjective: Patient reports neck stiffness. Would like to try using the cane NV. HEP Compliance:  [] Good [x] Fair [] Poor [] Reports not doing due to:    Vital Signs  Patient Currently in Pain: No   Pain Screening  Patient Currently in Pain: No    OBJECTIVE:   Exercises  Exercise 5: Gait: forward with focus on foot clearance and heelstrike with Gary UE support  Exercise 6: SLR x10 bilateral quad lag, decreased Lt quad lag  Exercise 7: Bridges 5\"x12  Exercise 13: Gary hip flexor str supine 30 sec x 3 b/l (limited ROM this date with assistance needed)  Exercise 17: Sanchez/56      *Indicates exercise, modality, or manual techniques to be initiated when appropriate    Assessment: Body structures, Functions, Activity limitations: Decreased functional mobility ,Decreased strength,Decreased endurance,Decreased balance,Decreased coordination  Assessment: Patient with increase in Sanchez score from 26/56 to 32/56, suggesting improved static/dynamic balance. Increased difficulty completing dynamic activities unsupported such as alternating toe taps. Limited ROM with hip flexor stretch requiring assistance to support. Patient feels he is progressing well with PT, would like to continue to address strength and balance.   Treatment Diagnosis: Impaired mobility  Prognosis: Good       Goals:  Short term goals  Time Frame for Short term goals: 2 weeks  Short term goal 1: Independent with HEP    Long term goals  Time Frame for Long term goals : 6 weeks  Long term goal 1: Improve shanae LE strength to >/= 4+/5 to improve stability with standing and walking. Long term goal 2: Pt will ambulate with LRD >/= 200' with improved overall quality S/I. Long term goal 3: Sanchez >/= 40/56 to reduce pt's risk for falls. Long term goal 4: TUG with LRD </= 20 sec to improve safety with ambulation. Long term goal 5: Pt will be independent with transfers from all surfaces with minimal to no UE use. Progress toward goals: Balance    POST-PAIN       Pain Rating (0-10 pain scale):   0/10   Location and pain description same as pre-treatment unless indicated. Action: [x] NA   [] Perform HEP  [] Meds as prescribed  [] Modalities as prescribed   [] Call Physician     Frequency/Duration:  Plan  Times per week: 2  Plan weeks: 6  Current Treatment Recommendations: Strengthening,ROM,Balance Training,Functional Mobility Training,Transfer Training,Gait Training,Stair training,Neuromuscular Re-education,Manual Therapy - Soft Tissue Mobilization,Home Exercise Program,Safety Education & Training,Patient/Caregiver Education & Training,Equipment Evaluation, Education, & procurement,Modalities,Integrated Dry Needling,Aquatics     Pt to continue current HEP. See objective section for any therapeutic exercise changes, additions or modifications this date.          PT Individual Minutes  Time In: 2850  Time Out: 1020  Minutes: 55  Timed Code Treatment Minutes: 53 Minutes  Procedure Minutes:0     Timed Activity Minutes Units   Ther Ex 23 2   Neuro 30 2       Signature:  Electronically signed by Pedro Luis Hartley PTA on 2/9/22 at 11:33 AM EST

## 2022-02-11 ENCOUNTER — HOSPITAL ENCOUNTER (OUTPATIENT)
Dept: PHYSICAL THERAPY | Age: 77
Setting detail: THERAPIES SERIES
Discharge: HOME | End: 2022-02-11
Payer: MEDICARE

## 2022-02-11 PROCEDURE — 97110 THERAPEUTIC EXERCISES: CPT

## 2022-02-11 PROCEDURE — 97116 GAIT TRAINING THERAPY: CPT

## 2022-02-11 NOTE — PROGRESS NOTES
[address] [address]  Outpatient Physical Therapy    Treatment Note        Date: 2022  Patient: Ashley Moore  : 1945  ACCT #: [de-identified]  Referring Practitioner: Dr. Vaishali Galarza  Diagnosis: Ataxia  Treatment Diagnosis: Impaired mobility    Visit Information:  PT Visit Information  PT Insurance Information: Medicare  Total # of Visits to Date: 8  Plan of Care/Certification Expiration Date: 22  No Show: 0  Progress Note Due Date: 22  Canceled Appointment: 1  Progress Note Counter:  (PN due 22)    Subjective: Pt reports feeling more confident with balance and walking. Would eventually like to work on completing step up from living room and kitchen without pulling himself up. HEP Compliance:  [x] Good [] Fair [] Poor [] Reports not doing due to:    Vital Signs  Patient Currently in Pain: No   Pain Screening  Patient Currently in Pain: No    OBJECTIVE:   Exercises  Exercise 5: Gait drills: along // bars with s/c  Exercise 13:  Gary hip flexor str supine 30 sec x 3 b/l (limited ROM this date with assistance needed)  Exercise 14: Sitting posture review, sitting horiz shoulder abd  Exercise 20: HEP: sitting posture    Ambulation 1  Surface: carpet  Device: Single point cane  Assistance: Stand by assistance  Quality of Gait: Occasional decreased knee extension control on Lt LE at end swing, varies between step to and step through gait pattern  Gait Deviations: Slow Aria,Decreased step length,Decreased step height  Distance: 80'    Timed Up and Go: 26.78 (with WW, with s/c 28.71sec)    Transfers  Sit to Stand: Supervision,Independent  Stand to sit: Supervision,Independent    Strength: [] NT  [x] MMT completed:  Strength RLE  Comment: Hip flex 5/5, knee ext/flex 5/5, DF 4+/5, hip abd 4/5  Strength LLE  Comment: Hip flex 4+/5, knee ext/flex 5/5, DF 4+/5, hip abd 3-/5    Assessment:   Activity Tolerance  Activity Tolerance: Patient Tolerated treatment well    Body structures, Functions, Activity limitations: Decreased functional mobility ,Decreased strength,Decreased endurance,Decreased balance,Decreased coordination  Assessment: Pt demonstrates improved shanae LE strength as seen with MMT. Pt with improving ability to complete sit to stand transfers without UE use from mat but still is challenged from a chair. Reviewed at length sitting posture and seated posture exercises to reduce strain on neck and improve standing posture. Pt with improved ability to complete TUG with WW and was able to complete with s/c. Pt able to ambulate with s/c with improved fluidity and step through gait pattern with repetition. Pt would benefit from further skilled PT to improve his strength, balance and safety with mobility to increase his overall QOL. Treatment Diagnosis: Impaired mobility  Prognosis: Good       Goals:  Short term goals  Time Frame for Short term goals: 2 weeks  Short term goal 1: Independent with HEP    Long term goals  Time Frame for Long term goals : 6 weeks  Long term goal 1: Improve shanae LE strength to >/= 4+/5 to improve stability with standing and walking. Long term goal 2: Pt will ambulate with LRD >/= 200' with improved overall quality S/I. Long term goal 3: Sanchez >/= 40/56 to reduce pt's risk for falls. Long term goal 4: TUG with LRD </= 20 sec to improve safety with ambulation. Long term goal 5: Pt will be independent with transfers from all surfaces with minimal to no UE use. Progress toward goals: see progress note    POST-PAIN       Pain Rating (0-10 pain scale):  0 /10   Location and pain description same as pre-treatment unless indicated.    Action: [x] NA   [] Perform HEP  [] Meds as prescribed  [] Modalities as prescribed   [] Call Physician     Frequency/Duration:  Plan  Times per week: 2  Plan weeks: 6  Current Treatment Recommendations: Strengthening,ROM,Balance Training,Functional Mobility Training,Transfer Training,Gait Training,Stair training,Neuromuscular Re-education,Manual Therapy - Soft Tissue Mobilization,Home Exercise Program,Safety Education & Training,Patient/Caregiver Education & Training,Equipment Evaluation, Education, & procurement,Modalities,Integrated Dry Needling,Aquatics     Pt to continue current HEP. See objective section for any therapeutic exercise changes, additions or modifications this date.     PT Individual Minutes  Time In: 1503  Time Out: 1600  Minutes: 57  Timed Code Treatment Minutes: 56 Minutes  Procedure Minutes:0     Timed Activity Minutes Units   Ther Ex 11 1   Gait 45 3       Signature:  Electronically signed by Thuy Ritchie PT on 2/11/22 at 3:00 PM EST

## 2022-02-11 NOTE — PROGRESS NOTES
[address]     Ph: [phone]  Fax: [phone]    [] Certification  [] Recertification []  Plan of Care  [x] Progress Note [] Discharge      To:  Dr. Sonia López      From:  Sandra Torres, PT  Patient: Aguila Hunt     : 1945  Diagnosis: Ataxia     Date: 2022  Treatment Diagnosis: Impaired mobility    Plan of Care/Certification Expiration Date: 22  Progress Report Period from:  2022  to 2022    Total # of Visits to Date: 8   No Show: 0    Canceled Appointment: 1     OBJECTIVE:   Short Term Goals - Time Frame for Short term goals: 2 weeks    Goals Current/Discharge status  Met   Short term goal 1: Independent with HEP  Independent and ongoing [x] yes  [] no     Long Term Goals - Time Frame for Long term goals : 6 weeks  Goals Current/ Discharge status Met   Long term goal 1: Improve shanae LE strength to >/= 4+/5 to improve stability with standing and walking. Strength RLE  Comment: Hip flex 5/5, knee ext/flex 5/5, DF 4+/5, hip abd 4/5  Strength LLE  Comment: Hip flex 4+/5, knee ext/flex 5/5, DF 4+/5, hip abd 3-/5 [x] yes  [x] no   Long term goal 2: Pt will ambulate with LRD >/= 200' with improved overall quality S/I. Ambulation 1  Surface: carpet  Device: Single point cane  Assistance: Stand by assistance  Quality of Gait: Occasional decreased knee extension control on Lt LE at end swing, varies between step to and step through gait pattern  Gait Deviations: Slow Aria,Decreased step length,Decreased step height  Distance: [de-identified]'   [x] yes  [x] no   Long term goal 3: Sanchez >/= 40/56 to reduce pt's risk for falls. 32/56 [] yes  [x] no   Long term goal 4: TUG with LRD </= 20 sec to improve safety with ambulation. Timed Up and Go: 26.78 (with [address], with s/c 28.71sec) [] yes  [x] no   Long term goal 5: Pt will be independent with transfers from all surfaces with minimal to no UE use. Able to complete S/I without UEs from mat but requires UE use from chair [] yes  [x] no        Body structures, Functions, Activity limitations: Decreased functional mobility ,Decreased strength,Decreased endurance,Decreased balance,Decreased coordination  Assessment: Pt demonstrates improved shanae LE strength as seen with MMT. Pt with improving ability to complete sit to stand transfers without UE use from mat but still is challenged from a chair. Reviewed at length sitting posture and seated posture exercises to reduce strain on neck and improve standing posture. Pt with improved ability to complete TUG with WW and was able to complete with s/c. Pt able to ambulate with s/c with improved fluidity and step through gait pattern with repetition. Pt would benefit from further skilled PT to improve his strength, balance and safety with mobility to increase his overall QOL. Prognosis: Good  Discharge Recommendations: Continue to assess pending progress      PLAN: [x] Evaluate and Treat  Frequency/Duration:  Plan  Times per week: 2  Plan weeks: 6  Current Treatment Recommendations: Strengthening,ROM,Balance Training,Functional Mobility Training,Transfer Training,Gait Training,Stair training,Neuromuscular Re-education,Manual Therapy - Soft Tissue Mobilization,Home Exercise Program,Safety Education & Training,Patient/Caregiver Education & Training,Equipment Evaluation, Education, & procurement,Modalities,Integrated Dry Needling,Aquatics     Precautions:   falls                         Patient Status:[x] Continue/ Initiate plan of Care    [] Discharge PT. Recommend pt continue with HEP. [] Additional visits requested, Please re-certify for additional visits:          Signature: Electronically signed by Salvador Medrano PT on 2/11/22 at 3:01 PM EST      If you have any questions or concerns, please don't hesitate to call.   Thank you for your referral.

## 2022-02-14 ENCOUNTER — HOSPITAL ENCOUNTER (OUTPATIENT)
Dept: PHYSICAL THERAPY | Age: 77
Setting detail: THERAPIES SERIES
Discharge: HOME | End: 2022-02-14
Payer: MEDICARE

## 2022-02-14 PROCEDURE — 97112 NEUROMUSCULAR REEDUCATION: CPT

## 2022-02-14 PROCEDURE — 97110 THERAPEUTIC EXERCISES: CPT

## 2022-02-14 PROCEDURE — 97116 GAIT TRAINING THERAPY: CPT

## 2022-02-14 PROCEDURE — 97140 MANUAL THERAPY 1/> REGIONS: CPT

## 2022-02-15 NOTE — PROGRESS NOTES
[facility]   Outpatient Physical Therapy   Treatment Note  [x] [address]  [] [facility]  Date: 2022  Patient: Aguila Hunt  : 1945  ACCT #: [de-identified]  Referring Practitioner: Dr. Sonia López  Diagnosis: Ataxia  Treatment Diagnosis: Impaired mobility     Visit Information:  PT Visit Information  PT Insurance Information: Medicare  Total # of Visits to Date: 5  Plan of Care/Certification Expiration Date: 22  No Show: 0  Progress Note Due Date: 22  Canceled Appointment: 1  Progress Note Counter:  (PN due 3/12/22)     SUBJECTIVE:   Subjective  Subjective: Pt states he slept really well after last visit. Denies increased pain. HEP Compliance:  [x] Good [] Fair [] Poor [] Reports not doing due to:    PAIN   Location:      Pain Rating (0-10 pain scale):   0 at rest  Pain Description:     Action:  [x] Acceptable for treatment  []  Other:    OBJECTIVE:   Exercises  Exercise 3: single step with str cane x5 ea   Exercise 4: standing wt shifts with str cane   Exercise 7: hurdles in // bars with 2 HRs and CGA  Exercise 10: 6\" step with Lt // bar and Rt str cane with SBA x2  Exercise 11: 8\" step up with Lt // bars and Rt str cane with CGA   Exercise 13: bilateral hip flexor str supine 30 sec/ 3   Exercise 20: HEP:     Manual: []  NA   Manual therapy  PROM: hams bilaterally with good response, hip IR/ ER    Mobility: []  NA   Ambulation 1  Surface: carpet  Device: Single point cane  Assistance: Stand by assistance,Contact guard assistance  Quality of Gait: fatigues quickly, VCs for weight shift and foot clearance, decreased Lt foot clearance with fatigue  Distance: [de-identified]' x2       HEP  Continue with current Home Exercise Program.  See Objective section for progression of HEP.       Comments:       POST-PAIN    Pain Rating (0-10 pain scale): 0  Location and Pain Description same as pre-pain unless otherwise indicated. Action: [x] NA  [] Call Physician  [] Perform HEP  [] Meds as prescribed     ASSESSMENT:     Conditions Requiring Skilled Therapeutic Intervention  Body structures, Functions, Activity limitations: Decreased functional mobility ,Decreased strength,Decreased endurance,Decreased balance,Decreased coordination  Assessment: Pt with improving gait quality and confidence with str cane. Initiated step ups - advised investigate grab bar on post near stairs for improved stability. Instructed on importance of increased compliance and frequency of HEP stretches. Treatment Diagnosis: Impaired mobility        Tolerance to treatment:  [x] Good   [] Fair   [] Poor  [] Fatigued   [] Increased pain   Limited by:    Goals:     Short term goals  Time Frame for Short term goals: 2 weeks  Short term goal 1: Independent with HEP  Long term goals  Time Frame for Long term goals : 6 weeks  Long term goal 1: Improve shanae LE strength to >/= 4+/5 to improve stability with standing and walking. Long term goal 2: Pt will ambulate with LRD >/= 200' with improved overall quality S/I. Long term goal 3: Sanchez >/= 40/56 to reduce pt's risk for falls. Long term goal 4: TUG with LRD </= 20 sec to improve safety with ambulation. Long term goal 5: Pt will be independent with transfers from all surfaces with minimal to no UE use. Progress toward goals:  Goals Met:    []  See updated POC   Comments:    PLAN:  [] Continue POC to pt tolerance  [] Hold PT for ___ days.   See note to physician  [] Discharge PT    Signature:   Electronically signed by Lorin Coyne PT on 2/14/22 at 9:10 PM EST    PT Individual Minutes  Time In: 1501  Time Out: 1600  Minutes: 59  Timed Code Treatment Minutes: 59 Minutes    Activity Minutes Units   Ther Ex 17 1   Manual   12  1   Neuro Ed 15 1   Gait  15 1

## 2022-02-18 ENCOUNTER — HOSPITAL ENCOUNTER (OUTPATIENT)
Dept: PHYSICAL THERAPY | Age: 77
Setting detail: THERAPIES SERIES
Discharge: HOME | End: 2022-02-18
Payer: MEDICARE

## 2022-02-18 PROCEDURE — 97116 GAIT TRAINING THERAPY: CPT

## 2022-02-18 PROCEDURE — 97110 THERAPEUTIC EXERCISES: CPT

## 2022-02-18 PROCEDURE — 97112 NEUROMUSCULAR REEDUCATION: CPT

## 2022-02-18 NOTE — PROGRESS NOTES
[address] [address]  Outpatient Physical Therapy    Treatment Note        Date: 2022  Patient: Marcie Alba  : 1945  ACCT #: [de-identified]  Referring Practitioner: Dr. Mary Locke  Diagnosis: Ataxia  Treatment Diagnosis: Impaired mobility    Visit Information:  PT Visit Information  PT Insurance Information: Medicare  Total # of Visits to Date: 10  Plan of Care/Certification Expiration Date: 22  No Show: 0  Canceled Appointment: 1  Progress Note Counter: 10/12 (PN due 3/12/22)    Subjective: Patient reports he was fatigue last visit. HEP Compliance:  [x] Good [] Fair [] Poor [] Reports not doing due to:    Vital Signs  Patient Currently in Pain: No   Pain Screening  Patient Currently in Pain: No    OBJECTIVE:   Exercises  Exercise 4: standing hip flexor stretch 1 minute Gary  Exercise 5: gait drills: forward, lateral, marching with one UE suport  Exercise 7: hurdles in // bars with 2 HRs and SBA forward/lateral  Exercise 10: 6'' step with // bars with SBA x 10  Exercise 11: 8'' step up with // bars with SBA x 5  Exercise 13: bilateral hip flexor str supine 30 sec/ 3   Exercise 20: HEP: continue with current        Ambulation 1  Surface: carpet  Device: Single point cane  Assistance: Stand by assistance,Contact guard assistance  Quality of Gait: needs cues for cane position, improved left LE clearance, decreased left LE stance time  Gait Deviations: Slow Aria,Decreased step length,Decreased step height  Distance: 60 ft         Strength: [x] NT  [] MMT completed:                   ROM: [x] NT  [] ROM measurements:                       *Indicates exercise, modality, or manual techniques to be initiated when appropriate    Assessment:       Body structures, Functions, Activity limitations: Decreased functional mobility ,Decreased strength,Decreased endurance,Decreased balance,Decreased coordination  Assessment: Patient demonstrates improved left LE coordination this date with step and hurdles. Patient is fatigued during session and needs rest breaks for improved tolerance. Verbal cues for cane postion provided, patient tends to extend cane to far forward with ambulation. Continue per POC. Treatment Diagnosis: Impaired mobility  Prognosis: Good       Goals:  Short term goals  Time Frame for Short term goals: 2 weeks  Short term goal 1: Independent with HEP    Long term goals  Time Frame for Long term goals : 6 weeks  Long term goal 1: Improve shanae LE strength to >/= 4+/5 to improve stability with standing and walking. Long term goal 2: Pt will ambulate with LRD >/= 200' with improved overall quality S/I. Long term goal 3: Sanchez >/= 40/56 to reduce pt's risk for falls. Long term goal 4: TUG with LRD </= 20 sec to improve safety with ambulation. Long term goal 5: Pt will be independent with transfers from all surfaces with minimal to no UE use. Progress toward goals:strength, balance, transfers, gait    POST-PAIN       Pain Rating (0-10 pain scale):   0/10   Location and pain description same as pre-treatment unless indicated. Action: [] NA   [] Perform HEP  [] Meds as prescribed  [] Modalities as prescribed   [] Call Physician     Frequency/Duration:  Plan  Times per week: 2  Plan weeks: 6  Current Treatment Recommendations: Strengthening,ROM,Balance Training,Functional Mobility Training,Transfer Training,Gait Training,Stair training,Neuromuscular Re-education,Manual Therapy - Soft Tissue Mobilization,Home Exercise Program,Safety Education & Training,Patient/Caregiver Education & Training,Equipment Evaluation, Education, & procurement,Modalities,Integrated Dry Needling,Aquatics     Pt to continue current HEP. See objective section for any therapeutic exercise changes, additions or modifications this date.          PT Individual Minutes  Time In: 1500  Time Out: [address] 72  Minutes: 55  Timed Code Treatment Minutes: 55 Minutes  Procedure Minutes:0 minutes     Timed Activity Minutes Units   Ther Ex 15 1   Neuro 30 2   Gait 10 1       Signature:  Electronically signed by Becca Kendall PT on 2/18/22 at 4:04 PM EST

## 2022-02-22 ENCOUNTER — HOSPITAL ENCOUNTER (OUTPATIENT)
Dept: PHYSICAL THERAPY | Age: 77
Setting detail: THERAPIES SERIES
Discharge: HOME | End: 2022-02-22
Payer: MEDICARE

## 2022-02-22 PROCEDURE — 97112 NEUROMUSCULAR REEDUCATION: CPT

## 2022-02-22 PROCEDURE — 97110 THERAPEUTIC EXERCISES: CPT

## 2022-02-22 PROCEDURE — 97116 GAIT TRAINING THERAPY: CPT

## 2022-02-22 NOTE — PROGRESS NOTES
[address] [address]  Outpatient Physical Therapy    Treatment Note        Date: 2022  Patient: Gerhardt Snooks  : 1945  ACCT #: [de-identified]  Referring Practitioner: Dr. Pedro Dooley  Diagnosis: Ataxia  Treatment Diagnosis: Impaired mobility    Visit Information:  PT Visit Information  PT Insurance Information: Medicare  Total # of Visits to Date: 6  Plan of Care/Certification Expiration Date: 22  No Show: 0  Canceled Appointment: 1  Progress Note Counter:  (PN due 3/12/22)    Subjective: Patient reports he would like to continue with therapy. He feels his mobility this better. Reports he would like to walk first before doing the standing exercises. HEP Compliance:  [x] Good [] Fair [] Poor [] Reports not doing due to:    Vital Signs  Patient Currently in Pain: No   Pain Screening  Patient Currently in Pain: No    OBJECTIVE:   Exercises  Exercise 4: standing hip flexor stretch 30s x 2, bilateral  Exercise 5: gait drills: forward walking with one Ue support (decrease support and improve fluid motion); retro ambulation with cues to improve upright posture  Exercise 7: hurdles in // bars with 2 HRs and SBA forward/lateral  Exercise 11: 8'' step up with // bars with SBA x 10  Exercise 13: bilateral hip flexor str supine 30 sec/ 3   Exercise 18: toe tap on [place] (forward only) with cane x 10       Ambulation 1  Surface: carpet  Device: Single point cane  Assistance: Stand by assistance  Quality of Gait: step to progressing to step thru gait  Gait Deviations: Slow Aria,Decreased step length,Decreased step height  Distance: 80 ft, 30 ft         Strength: [x] NT  [] MMT completed:                   ROM: [x] NT  [] ROM measurements:                       *Indicates exercise, modality, or manual techniques to be initiated when appropriate    Assessment:       Body structures, Functions, Activity limitations: Decreased functional mobility ,Decreased strength,Decreased endurance,Decreased balance,Decreased coordination  Assessment: Patient demonstrates fatigue during session needing rest breaks for improved tolerance. Continued to work on ambulation, coordination, and improved endurance for overall improved mobility and balance. Patient's gait improved after few couple of steps to more of step through pattern but demonstrates decreased eccentric control in left LE occasionally. Continue per POC. Treatment Diagnosis: Impaired mobility  Prognosis: Good       Goals:  Short term goals  Time Frame for Short term goals: 2 weeks  Short term goal 1: Independent with HEP    Long term goals  Time Frame for Long term goals : 6 weeks  Long term goal 1: Improve shanae LE strength to >/= 4+/5 to improve stability with standing and walking. Long term goal 2: Pt will ambulate with LRD >/= 200' with improved overall quality S/I. Long term goal 3: Sanchez >/= 40/56 to reduce pt's risk for falls. Long term goal 4: TUG with LRD </= 20 sec to improve safety with ambulation. Long term goal 5: Pt will be independent with transfers from all surfaces with minimal to no UE use. Progress toward goals: strength, ambulation    POST-PAIN       Pain Rating (0-10 pain scale):   0/10   Location and pain description same as pre-treatment unless indicated. Action: [] NA   [] Perform HEP  [] Meds as prescribed  [] Modalities as prescribed   [] Call Physician     Frequency/Duration:  Plan  Times per week: 2  Plan weeks: 6  Current Treatment Recommendations: Strengthening,ROM,Balance Training,Functional Mobility Training,Transfer Training,Gait Training,Stair training,Neuromuscular Re-education,Manual Therapy - Soft Tissue Mobilization,Home Exercise Program,Safety Education & Training,Patient/Caregiver Education & Training,Equipment Evaluation, Education, & procurement,Modalities,Integrated Dry Needling,Aquatics     Pt to continue current HEP.   See objective section for any therapeutic exercise changes, additions or modifications this date.          PT Individual Minutes  Time In: 1300  Time Out: 1400  Minutes: 60  Timed Code Treatment Minutes: 60 Minutes  Procedure Minutes:0 minutes     Timed Activity Minutes Units   Ther Ex 10 1   Neuro 30 2   Gait 20 1       Signature:  Electronically signed by Becca Kendall PT on 2/22/22 at 4:19 PM EST

## 2022-02-25 ENCOUNTER — HOSPITAL ENCOUNTER (OUTPATIENT)
Dept: PHYSICAL THERAPY | Age: 77
Setting detail: THERAPIES SERIES
Discharge: HOME | End: 2022-02-25
Payer: MEDICARE

## 2022-02-25 PROCEDURE — 97116 GAIT TRAINING THERAPY: CPT

## 2022-02-25 PROCEDURE — 97112 NEUROMUSCULAR REEDUCATION: CPT

## 2022-02-25 NOTE — PROGRESS NOTES
[address] [address]  Outpatient Physical Therapy    Treatment Note        Date: 2022  Patient: Page Angel  : 1945  ACCT #: [de-identified]  Referring Practitioner: Dr. Betty Phan  Diagnosis: Ataxia  Treatment Diagnosis: Impaired mobility    Visit Information:  PT Visit Information  PT Insurance Information: Medicare  Total # of Visits to Date: 12  Plan of Care/Certification Expiration Date: 22  No Show: 0  Canceled Appointment: 1  Progress Note Counter:  (PN due 3/12/22)    Subjective: Pt reports feels good use str cane. HEP Compliance:  [x] Good [] Fair [] Poor [] Reports not doing due to:    Vital Signs  Patient Currently in Pain: No   Pain Screening  Patient Currently in Pain: No    OBJECTIVE:   Exercises  Exercise 1: Sanchez 34/56  Exercise 5: gait drills: fwd with 1 UE support with emphasis on foot clearance and upright posture  Exercise 10: 6'' step up with // bars with SBA x 10    Timed Up and Go: 26.95 (with [employer], 27.29 sec with str cane)    Ambulation 1  Surface: carpet  Device: Single point cane  Assistance: Supervision  Quality of Gait: Initially with decreased sequencing, though improved as distance progressed, cues to keep cane closer to body with turn  Distance: 80 ft, 100 ft with 1 standing rest break    Transfers  Sit to Stand: Modified independent  Stand to sit: Modified independent  Comment: Emphasis on decreased reliance on UEs and posterior lean against table, able to complete without UE from raised surface of mat though needing UE from chair    Strength: [] NT  [x] MMT completed:  Strength RLE  Comment: Hip flex 5/5, knee ext/flex 5/5, DF 4+/5, hip abd 4/5  Strength LLE  Comment: Hip flex 4+/5, knee ext/flex 5/5, DF 4+/5, hip abd 3/5    *Indicates exercise, modality, or manual techniques to be initiated when appropriate    Assessment:    Body structures, Functions, Activity limitations: Decreased functional mobility ,Decreased strength,Decreased endurance,Decreased balance,Decreased coordination  Assessment: Pt progressing well with functional activities, specifically with ambulation with str cane. Continues to demo downward gaze and initially decreased coordination though confidence improves as distance progresses. Improved score on Sanchez by 2 points vs last measure. Pt would benefit from continued therapy to continue to progress gait with LRD and overall tolerance to functional activities. Treatment Diagnosis: Impaired mobility        Goals:  Short term goals  Time Frame for Short term goals: 2 weeks  Short term goal 1: Independent with HEP    Long term goals  Time Frame for Long term goals : 6 weeks  Long term goal 1: Improve shanae LE strength to >/= 4+/5 to improve stability with standing and walking. Long term goal 2: Pt will ambulate with LRD >/= 200' with improved overall quality S/I. Long term goal 3: Sanchez >/= 40/56 to reduce pt's risk for falls. Long term goal 4: TUG with LRD </= 20 sec to improve safety with ambulation. Long term goal 5: Pt will be independent with transfers from all surfaces with minimal to no UE use. Progress toward goals: Tested for POC    POST-PAIN       Pain Rating (0-10 pain scale):   0/10   Location and pain description same as pre-treatment unless indicated. Action: [] NA   [x] Perform HEP  [] Meds as prescribed  [] Modalities as prescribed   [] Call Physician     Frequency/Duration:  Plan  Times per week: 2  Plan weeks: 6  Current Treatment Recommendations: Strengthening,ROM,Balance Training,Functional Mobility Training,Transfer Training,Gait Training,Stair training,Neuromuscular Re-education,Manual Therapy - Soft Tissue Mobilization,Home Exercise Program,Safety Education & Training,Patient/Caregiver Education & Training,Equipment Evaluation, Education, & procurement,Modalities,Integrated Dry Needling,Aquatics     Pt to continue current HEP.   See objective section for any therapeutic exercise changes, additions or modifications this date.         PT Individual Minutes  Time In: 1400  Time Out: 1500  Minutes: 60  Timed Code Treatment Minutes: 60 Minutes  Procedure Minutes: 0     Timed Activity Minutes Units   Gait 30 2   Neuro  30 2       Signature:  Electronically signed by Mickie Bellamy PTA on 2/25/22 at 2:04 PM EST

## 2022-02-25 NOTE — PROGRESS NOTES
[address] [facility], [address]     Ph: [phone]  Fax: [phone]    [] Certification  [] Recertification [x]  Plan of Care  [] Progress Note [] Discharge      To:  Dr. Mathieu Subramanian      From:  Benny Pedroza, PT, DPT  Patient: Rhea Cruz     : 1945  Diagnosis: Ataxia     Date: 2022  Treatment Diagnosis: Impaired mobility    Plan of Care/Certification Expiration Date: 22  Progress Report Period from:  2022  to 2022    Total # of Visits to Date: 12   No Show: 0    Canceled Appointment: 1     OBJECTIVE:   Short Term Goals - Time Frame for Short term goals: 2 weeks    Goals Current/Discharge status  Met   Short term goal 1: Independent with HEP  Pt reports compliance with HEP [x] yes  [] no     Long Term Goals - Time Frame for Long term goals : 6 weeks  Goals Current/ Discharge status Met   Long term goal 1: Improve shanae LE strength to >/= 4+/5 to improve stability with standing and walking. Strength RLE  Comment: Hip flex 5/5, knee ext/flex 5/5, DF 4+/5, hip abd 4/5  Strength LLE  Comment: Hip flex 4+/5, knee ext/flex 5/5, DF 4+/5, hip abd 3/5   [x] yes  [x] no   Long term goal 2: Pt will ambulate with LRD >/= 200' with improved overall quality S/I. Ambulation 1  Surface: carpet  Device: Single point cane  Assistance: Supervision  Quality of Gait: Initially with decreased sequencing, though improved as distance progressed, cues to keep cane closer to body with turn  Distance: 80 ft, 100 ft with 1 standing rest break   [] yes  [x] no   Long term goal 3: Sanchez >/= 40/56 to reduce pt's risk for falls. Sanchez Balance Score: 34   [] yes  [x] no   Long term goal 4: TUG with LRD </= 20 sec to improve safety with ambulation. Timed Up and Go: 26.95 (with [employer], 27.29 sec with str cane)   [] yes  [x] no   Long term goal 5: Pt will be independent with transfers from all surfaces with minimal to no UE use. Transfers  Sit to Stand: Modified independent  Stand to sit: Modified independent  Comment: Emphasis on decreased reliance on UEs and posterior lean against table, able to complete without UE from raised surface of mat though needing UE from chair [x] yes  [x] no    NEW GOAL: Pt will complete 1 curb step S/I with use of s/c with good safety. Body structures, Functions, Activity limitations: Decreased functional mobility ,Decreased strength,Decreased endurance,Decreased balance,Decreased coordination  Assessment: Pt progressing well with functional activities, specifically with ambulation with str cane. Continues to demo downward gaze and initially decreased coordination though confidence improves as distance progresses. Improved score on Sanchez by 2 points vs last measure. Pt would benefit from continued therapy to continue to progress gait with LRD and overall tolerance to functional activities. PLAN: [] Evaluate and Treat  Frequency/Duration:  Plan  Times per week: 2  Plan weeks: 6  Current Treatment Recommendations: Strengthening,ROM,Balance Training,Functional Mobility Training,Transfer Training,Gait Training,Stair training,Neuromuscular Re-education,Manual Therapy - Soft Tissue Mobilization,Home Exercise Program,Safety Education & Training,Patient/Caregiver Education & Training,Equipment Evaluation, Education, & procurement,Modalities,Integrated Dry Needling,Aquatics     Precautions:  OA, CVA, HTN, falls                          Patient Status:[x] Continue/ Initiate plan of Care    [] Discharge PT. Recommend pt continue with HEP. [x] Additional visits requested, Please re-certify for additional visits: 12          Signature: Objective information by: Electronically signed by Mickie Bellamy PTA on 2/25/22 at 2:04 PM EST  Electronically signed by Chitra Villarreal PT on 3/1/2022 at 4:50 PM      If you have any questions or concerns, please don't hesitate to call.   Thank you for your referral.

## 2022-03-02 ENCOUNTER — HOSPITAL ENCOUNTER (OUTPATIENT)
Dept: PHYSICAL THERAPY | Age: 77
Setting detail: THERAPIES SERIES
Discharge: HOME | End: 2022-03-02
Payer: MEDICARE

## 2022-03-02 PROCEDURE — 97112 NEUROMUSCULAR REEDUCATION: CPT

## 2022-03-02 PROCEDURE — 97116 GAIT TRAINING THERAPY: CPT

## 2022-03-02 NOTE — PROGRESS NOTES
[address] [address]  Outpatient Physical Therapy    Treatment Note        Date: 3/2/2022  Patient: Aguila Hunt  : 1945  ACCT #: [de-identified]  Referring Practitioner: Dr. Sonia López  Diagnosis: Ataxia  Treatment Diagnosis: Impaired mobility    Visit Information:  PT Visit Information  PT Insurance Information: Medicare  Total # of Visits to Date: 13  Plan of Care/Certification Expiration Date: 22  No Show: 0  Canceled Appointment: 1  Progress Note Counter:  (PN due 3/27/22)    Subjective: Pt states went to gym on . States did 15 min on recumbent stepper. Also notes was able to ambulate into and out of gym without issue. HEP Compliance:  [x] Good [] Fair [] Poor [] Reports not doing due to:    Vital Signs  Patient Currently in Pain: No   Pain Screening  Patient Currently in Pain: No    OBJECTIVE:   Exercises  Exercise 2: Figure 8 with str cane  Exercise 3: NDT stand with RLE on foam static stand 30 sec x 3  Exercise 5: gait drills: fwd/retro with 1 UE support with emphasis on foot clearance and upright posture  Exercise 7: 6\" hurdles F in // bars with 2 HRs  Exercise 9: NDT STS from mat, 2\" block under RLE with 1 UE 2 x 5  Exercise 10: 6'' step up with // bars with SBA x 10 b/l    Ambulation 1  Surface: carpet  Device: Single point cane  Assistance: Supervision  Quality of Gait: Decreased coordination with increased LLE tone this date, downward gaze, continued fwd flxn  Distance: 80 ft, 100 ft    *Indicates exercise, modality, or manual techniques to be initiated when appropriate    Assessment: Body structures, Functions, Activity limitations: Decreased functional mobility ,Decreased strength,Decreased endurance,Decreased balance,Decreased coordination  Assessment: Pt initially with decreased LLE coordination with gait. Added figure 8s to improve consistency with turns as pt demonstrates wide turns. Added NDT STS to improve LLE WB and decrease tone post gait.  Challenged by stepping over hurdles with fatigue noted at end of session. Treatment Diagnosis: Impaired mobility        Goals:  Short term goals  Time Frame for Short term goals: 2 weeks  Short term goal 1: Independent with HEP    Long term goals  Time Frame for Long term goals : 6 weeks  Long term goal 1: Improve shanae LE strength to >/= 4+/5 to improve stability with standing and walking. Long term goal 2: Pt will ambulate with LRD >/= 200' with improved overall quality S/I. Long term goal 3: Sanchez >/= 40/56 to reduce pt's risk for falls. Long term goal 4: TUG with LRD </= 20 sec to improve safety with ambulation. Long term goal 5: Pt will be independent with transfers from all surfaces with minimal to no UE use. Long term goal 6: Pt will complete 1 curb step S/I with use of s/c with good safety. Progress toward goals: LTG1, LTG2, LTG3, LTG4, LTG5, LTG6    POST-PAIN       Pain Rating (0-10 pain scale):   0/10   Location and pain description same as pre-treatment unless indicated. Action: [] NA   [x] Perform HEP  [] Meds as prescribed  [] Modalities as prescribed   [] Call Physician     Frequency/Duration:  Plan  Times per week: 2  Plan weeks: 6  Current Treatment Recommendations: Strengthening,ROM,Balance Training,Functional Mobility Training,Transfer Training,Gait Training,Stair training,Neuromuscular Re-education,Manual Therapy - Soft Tissue Mobilization,Home Exercise Program,Safety Education & Training,Patient/Caregiver Education & Training,Equipment Evaluation, Education, & procurement,Modalities,Integrated Dry Needling,Aquatics     Pt to continue current HEP. See objective section for any therapeutic exercise changes, additions or modifications this date.          PT Individual Minutes  Time In: 1500  Time Out: 3463  Minutes: 58  Timed Code Treatment Minutes: 58 Minutes  Procedure Minutes: 0     Timed Activity Minutes Units   Neuro 43 3   Gait 15 1       Signature:  Electronically signed by Vance Falcon PTA on 3/2/22 at 5:05 PM EST

## 2022-03-04 ENCOUNTER — HOSPITAL ENCOUNTER (OUTPATIENT)
Dept: PHYSICAL THERAPY | Age: 77
Setting detail: THERAPIES SERIES
Discharge: HOME | End: 2022-03-04
Payer: MEDICARE

## 2022-03-04 PROCEDURE — 97116 GAIT TRAINING THERAPY: CPT

## 2022-03-04 PROCEDURE — 97110 THERAPEUTIC EXERCISES: CPT

## 2022-03-04 PROCEDURE — 97112 NEUROMUSCULAR REEDUCATION: CPT

## 2022-03-04 NOTE — PROGRESS NOTES
[address] [address]  Outpatient Physical Therapy    Treatment Note        Date: 3/4/2022  Patient: Marsha Miller  : 1945  ACCT #: [de-identified]  Referring Practitioner: Dr. Papito Patton  Diagnosis: Ataxia  Treatment Diagnosis: Impaired mobility    Visit Information:  PT Visit Information  PT Insurance Information: Medicare  Total # of Visits to Date: 14  Plan of Care/Certification Expiration Date: 22  No Show: 0  Progress Note Due Date: 22  Canceled Appointment: 1  Progress Note Counter:  (PN due 3/27/22)    Subjective: Pt states he still is going to the Gym     HEP Compliance:  [x] Good [] Fair [] Poor [] Reports not doing due to:    Vital Signs  Patient Currently in Pain: No   Pain Screening  Patient Currently in Pain: No    OBJECTIVE:   Exercises  Exercise 1: Curb step 5\" : see Amb. Exercise 5: gait drills: fwd/retro/Lat with 1 UE support with emphasis on foot clearance and upright posture  Exercise 7: 6\" hurdles F/ L in // bars with 2 HRs  Exercise 9: NDT STS from mat, 2\" block under RLE with 1-2 UE 2 x 5  Exercise 11: 8'' step up with // bars with SBA x 10  Exercise 12: Alt taps 2\" block 0-2 ue supp  Exercise 13: bilateral hip flexor str supine 30 sec/ 3     Ambulation 1  Surface: carpet  Device: Single point cane  Assistance: Supervision  Quality of Gait: Heavy  LLE step down with decreased heel strike, downward gaze, continued fwd flxn  Gait Deviations: Slow Aria,Decreased step length,Decreased step height  Distance: 60 ft         Stairs  Curbs:  (5\" curb)  Device: Single pt cane (1 // bar)  Assistance: Contact guard assistance  Comment: NR       Strength: [x] NT  [] MMT completed:        *Indicates exercise, modality, or manual techniques to be initiated when appropriate    Assessment:         Body structures, Functions, Activity limitations: Decreased functional mobility ,Decreased strength,Decreased endurance,Decreased balance,Decreased coordination  Assessment: Inititated curb step with SPC and 0-1HR with CGA for safety. Pt with improved safety with spc and 1 ue support vs. SPC only. Initiated lateral stepping over 6\" rima with pt demonstrating increased difficulty going to the Rt vs. Lt.  Treatment Diagnosis: Impaired mobility          Goals:  Short term goals  Time Frame for Short term goals: 2 weeks  Short term goal 1: Independent with HEP    Long term goals  Time Frame for Long term goals : 6 weeks  Long term goal 1: Improve shanae LE strength to >/= 4+/5 to improve stability with standing and walking. Long term goal 2: Pt will ambulate with LRD >/= 200' with improved overall quality S/I. Long term goal 3: Sanchez >/= 40/56 to reduce pt's risk for falls. Long term goal 4: TUG with LRD </= 20 sec to improve safety with ambulation. Long term goal 5: Pt will be independent with transfers from all surfaces with minimal to no UE use. Long term goal 6: Pt will complete 1 curb step S/I with use of s/c with good safety. Progress toward goals:Curb step, rom, strength, balance, gait    POST-PAIN       Pain Rating (0-10 pain scale):   0/10   Location and pain description same as pre-treatment unless indicated. Action: [x] NA   [] Perform HEP  [] Meds as prescribed  [] Modalities as prescribed   [] Call Physician     Frequency/Duration:  Plan  Times per week: 2  Plan weeks: 6  Current Treatment Recommendations: Strengthening,ROM,Balance Training,Functional Mobility Training,Transfer Training,Gait Training,Stair training,Neuromuscular Re-education,Manual Therapy - Soft Tissue Mobilization,Home Exercise Program,Safety Education & Training,Patient/Caregiver Education & Training,Equipment Evaluation, Education, & procurement,Modalities,Integrated Dry Needling,Aquatics     Pt to continue current HEP. See objective section for any therapeutic exercise changes, additions or modifications this date.          PT Individual Minutes  Time In: 9998  Time Out: 1601  Minutes: 56  Timed Code Treatment Minutes: 56 Minutes  Procedure Minutes:0     Timed Activity Minutes Units   Gait 15 1   Ther Ex 20 1   Neuro 21 2       Signature:  Electronically signed by Willem Torres PTA on 3/4/22 at 3:10 PM EST

## 2022-03-08 NOTE — TELEPHONE ENCOUNTER
Comments:     Last Office Visit (last PCP visit):   Visit date not found    Next Visit Date:  Future Appointments   Date Time Provider Reggie Treviño   3/9/2022  3:00 PM [facility], PTA [address]   3/11/2022  3:00 PM Genevieve Erica, PTA [address]   3/16/2022  3:00 PM [facility] [place] [facility], PTA [address]   3/18/2022  3:00 PM Genevieve Erica, PTA [address]   3/23/2022  3:00 PM Sandra Torres, [address]   3/25/2022  3:00 PM Genevieve Erica, PTA [address]   3/29/2022  2:00 PM Genevieve Erica, PTA [address]   4/1/2022  3:00 PM M[facility] [place], PTA [address]   4/6/2022  3:00 PM Miki [place], PTA [facility]   4/8/2022  3:00 PM Sandra Torres, [address]       **If hasn't been seen in over a year OR hasn't followed up according to last diabetes/ADHD visit, make appointment for patient before sending refill to provider.     Rx requested:  Requested Prescriptions     Pending Prescriptions Disp Refills    torsemide (DEMADEX) 20 MG tablet 180 tablet 3     Sig: Take 1 tablet by mouth 2 times daily

## 2022-03-09 ENCOUNTER — HOSPITAL ENCOUNTER (OUTPATIENT)
Dept: PHYSICAL THERAPY | Age: 77
Setting detail: THERAPIES SERIES
Discharge: HOME | End: 2022-03-09
Payer: MEDICARE

## 2022-03-09 PROCEDURE — 97110 THERAPEUTIC EXERCISES: CPT

## 2022-03-09 PROCEDURE — 97112 NEUROMUSCULAR REEDUCATION: CPT

## 2022-03-09 PROCEDURE — 97116 GAIT TRAINING THERAPY: CPT

## 2022-03-09 RX ORDER — TORSEMIDE 20 MG/1
20 TABLET ORAL 2 TIMES DAILY
Qty: 180 TABLET | Refills: 3 | Status: SHIPPED | OUTPATIENT
Start: 2022-03-09

## 2022-03-09 NOTE — PROGRESS NOTES
turns.  Treatment Diagnosis: Impaired mobility        Goals:  Short term goals  Time Frame for Short term goals: 2 weeks  Short term goal 1: Independent with HEP    Long term goals  Time Frame for Long term goals : 6 weeks  Long term goal 1: Improve shanae LE strength to >/= 4+/5 to improve stability with standing and walking. Long term goal 2: Pt will ambulate with LRD >/= 200' with improved overall quality S/I. Long term goal 3: Sanchez >/= 40/56 to reduce pt's risk for falls. Long term goal 4: TUG with LRD </= 20 sec to improve safety with ambulation. Long term goal 5: Pt will be independent with transfers from all surfaces with minimal to no UE use. Long term goal 6: Pt will complete 1 curb step S/I with use of s/c with good safety. Progress toward goals: LTG1, LTG2, LTG3, LTG4, LTG5, LTG6    POST-PAIN       Pain Rating (0-10 pain scale):   0/10   Location and pain description same as pre-treatment unless indicated. Action: [] NA   [x] Perform HEP  [] Meds as prescribed  [] Modalities as prescribed   [] Call Physician     Frequency/Duration:  Plan  Times per week: 2  Plan weeks: 6  Current Treatment Recommendations: Strengthening,ROM,Balance Training,Functional Mobility Training,Transfer Training,Gait Training,Stair training,Neuromuscular Re-education,Manual Therapy - Soft Tissue Mobilization,Home Exercise Program,Safety Education & Training,Patient/Caregiver Education & Training,Equipment Evaluation, Education, & procurement,Modalities,Integrated Dry Needling,Aquatics     Pt to continue current HEP. See objective section for any therapeutic exercise changes, additions or modifications this date.          PT Individual Minutes  Time In: 2324  Time Out: 6617  Minutes: 54  Timed Code Treatment Minutes: 54 Minutes  Procedure Minutes: 0     Timed Activity Minutes Units   Ther Ex 15 1   Gait 15 1   Neuro 24 2       Signature:  Electronically signed by Kim Hoffman PTA on 3/9/22 at 3:07 PM EST

## 2022-03-11 ENCOUNTER — HOSPITAL ENCOUNTER (OUTPATIENT)
Dept: PHYSICAL THERAPY | Age: 77
Setting detail: THERAPIES SERIES
Discharge: HOME | End: 2022-03-11
Payer: MEDICARE

## 2022-03-11 PROCEDURE — 97112 NEUROMUSCULAR REEDUCATION: CPT

## 2022-03-11 PROCEDURE — 97116 GAIT TRAINING THERAPY: CPT

## 2022-03-11 NOTE — PROGRESS NOTES
[address] [address]  Outpatient Physical Therapy    Treatment Note        Date: 3/11/2022  Patient: Maggie Petit  : 1945  ACCT #: [de-identified]  Referring Practitioner: Dr. Neela Thomas  Diagnosis: Ataxia  Treatment Diagnosis: Impaired mobility    Visit Information:  PT Visit Information  PT Insurance Information: Medicare  Total # of Visits to Date: 12  Plan of Care/Certification Expiration Date: 22  No Show: 0  Canceled Appointment: 1  Progress Note Counter:  (PN due 3/27/22)    Subjective: Pt reports fatigued after LV. HEP Compliance:  [x] Good [] Fair [] Poor [] Reports not doing due to:    Vital Signs  Patient Currently in Pain: No   Pain Screening  Patient Currently in Pain: No    OBJECTIVE:   Exercises  Exercise 5: Obstacle course: Ambulation with str cane over str canes, added 2\" curb and red foam mat with and without bean bags for compliant surfaces  Exercise 7: 6\" hurdles F/ L in // bars with str cane CGA  Exercise 8: Seated lateral step over 3\" box x10 Lt  Exercise 9: NDT STS from mat, 2\" block under RLE with 1 UE  x 10    Ambulation 1  Surface: carpet  Device: Single point cane  Assistance: Supervision  Quality of Gait: Improved coordination and sequencing with str cane, continued downward gaze and fwd flexion  Distance: 75 ft    *Indicates exercise, modality, or manual techniques to be initiated when appropriate    Assessment: Body structures, Functions, Activity limitations: Decreased functional mobility ,Decreased strength,Decreased endurance,Decreased balance,Decreased coordination  Assessment: Focus of tx on dynamic gait with str cane. Completed gait with obstacles of stepping over canes to simulate transitions, onto small curb step, and on foam mat for compliant surface requiring SBA-CGA. Increased anterior momentum with ambulation over canes. Able to complete amb over 6\" hurdles with st cane with CGA.   Treatment Diagnosis: Impaired mobility          Goals:  Short term goals  Time Frame for Short term goals: 2 weeks  Short term goal 1: Independent with HEP    Long term goals  Time Frame for Long term goals : 6 weeks  Long term goal 1: Improve shanae LE strength to >/= 4+/5 to improve stability with standing and walking. Long term goal 2: Pt will ambulate with LRD >/= 200' with improved overall quality S/I. Long term goal 3: Sanchez >/= 40/56 to reduce pt's risk for falls. Long term goal 4: TUG with LRD </= 20 sec to improve safety with ambulation. Long term goal 5: Pt will be independent with transfers from all surfaces with minimal to no UE use. Long term goal 6: Pt will complete 1 curb step S/I with use of s/c with good safety. Progress toward goals: LTG1, LTG2, LTG3, LTG4, LTG6    POST-PAIN       Pain Rating (0-10 pain scale):   0/10   Location and pain description same as pre-treatment unless indicated. Action: [] NA   [x] Perform HEP  [] Meds as prescribed  [] Modalities as prescribed   [] Call Physician     Frequency/Duration:  Plan  Times per week: 2  Plan weeks: 6  Current Treatment Recommendations: Strengthening,ROM,Balance Training,Functional Mobility Training,Transfer Training,Gait Training,Stair training,Neuromuscular Re-education,Manual Therapy - Soft Tissue Mobilization,Home Exercise Program,Safety Education & Training,Patient/Caregiver Education & Training,Equipment Evaluation, Education, & procurement,Modalities,Integrated Dry Needling,Aquatics     Pt to continue current HEP. See objective section for any therapeutic exercise changes, additions or modifications this date.          PT Individual Minutes  Time In: 0888  Time Out: 6827  Minutes: 57  Timed Code Treatment Minutes: 57 Minutes  Procedure Minutes: 0     Timed Activity Minutes Units   Gait 40 3   Neuro  17 1       Signature:  Electronically signed by Jackeline Gaitan PTA on 3/11/22 at 4:05 PM EST

## 2022-03-16 ENCOUNTER — HOSPITAL ENCOUNTER (OUTPATIENT)
Dept: PHYSICAL THERAPY | Age: 77
Setting detail: THERAPIES SERIES
Discharge: HOME | End: 2022-03-16
Payer: MEDICARE

## 2022-03-16 PROCEDURE — 97116 GAIT TRAINING THERAPY: CPT

## 2022-03-16 PROCEDURE — 97112 NEUROMUSCULAR REEDUCATION: CPT

## 2022-03-16 NOTE — PROGRESS NOTES
[id number] [address]  Outpatient Physical Therapy    Treatment Note        Date: 3/16/2022  Patient: Nas Marshall  : 1945  ACCT #: [de-identified]  Referring Practitioner: Dr. Harshal Juarez  Diagnosis: Ataxia  Treatment Diagnosis: Impaired mobility    Visit Information:  PT Visit Information  PT Insurance Information: Medicare  Total # of Visits to Date: 16  Plan of Care/Certification Expiration Date: 22  No Show: 0  Canceled Appointment: 1  Progress Note Counter:  (PN due 3/27/22)    Subjective: Pt reports continued good compliance with gym and HEP. HEP Compliance:  [x] Good [] Fair [] Poor [] Reports not doing due to:    Vital Signs  Patient Currently in Pain: No   Pain Screening  Patient Currently in Pain: No    OBJECTIVE:   Exercises  Exercise 9: NDT STS from mat, 2\" block under RLE with 1 UE 2 x 10  Exercise 10: 6\" step up/down with 1 UE support  Exercise 17: Static stand: tandem, FT, horizontal head turns  Exercise 18: Foam: static stand, head turns, NDT with Rt foot on foam    Ambulation 1  Surface: carpet  Device: Single point cane  Assistance: Supervision  Quality of Gait: WBOS, Inconsistent step length d/t decreased LLE motor control, continued downward gaze and fwd flexion  Distance: 125 ft, 75 ft    *Indicates exercise, modality, or manual techniques to be initiated when appropriate    Assessment: Body structures, Functions, Activity limitations: Decreased functional mobility ,Decreased strength,Decreased endurance,Decreased balance,Decreased coordination  Assessment: Trialed ambulation with bariatric WW, which pt states he has at home. Improved posture noted. States will begin using for community distances. During step ups, pt failed to complete step up with RLE and 1 UE support and fell onto Lt knee. Pt assisted into sitting position on step.  Pt then assisted in transfer from sitting on step to partial stand with MODA from therapist with pt utilizing // bar to assist with transfer. Pt denies injury. Able to continue with step up activity utilizing BUEs without complaint. Treatment Diagnosis: Impaired mobility        Goals:  Short term goals  Time Frame for Short term goals: 2 weeks  Short term goal 1: Independent with HEP    Long term goals  Time Frame for Long term goals : 6 weeks  Long term goal 1: Improve shanae LE strength to >/= 4+/5 to improve stability with standing and walking. Long term goal 2: Pt will ambulate with LRD >/= 200' with improved overall quality S/I. Long term goal 3: Sanchez >/= 40/56 to reduce pt's risk for falls. Long term goal 4: TUG with LRD </= 20 sec to improve safety with ambulation. Long term goal 5: Pt will be independent with transfers from all surfaces with minimal to no UE use. Long term goal 6: Pt will complete 1 curb step S/I with use of s/c with good safety. Progress toward goals: LTG1, LTG2, LTG3, LTG4, LTG5, LTG6    POST-PAIN       Pain Rating (0-10 pain scale):  0 /10   Location and pain description same as pre-treatment unless indicated. Action: [] NA   [x] Perform HEP  [] Meds as prescribed  [] Modalities as prescribed   [] Call Physician     Frequency/Duration:  Plan  Times per week: 2  Plan weeks: 6  Current Treatment Recommendations: Strengthening,ROM,Balance Training,Functional Mobility Training,Transfer Training,Gait Training,Stair training,Neuromuscular Re-education,Manual Therapy - Soft Tissue Mobilization,Home Exercise Program,Safety Education & Training,Patient/Caregiver Education & Training,Equipment Evaluation, Education, & procurement,Modalities,Integrated Dry Needling,Aquatics     Pt to continue current HEP. See objective section for any therapeutic exercise changes, additions or modifications this date.          PT Individual Minutes  Time In: 1500  Time Out: [address]  Minutes: 55  Timed Code Treatment Minutes: 55 Minutes  Procedure Minutes: 0     Timed Activity Minutes Units   Neuro 40 3   Gait 15 1       Signature:

## 2022-03-18 ENCOUNTER — HOSPITAL ENCOUNTER (OUTPATIENT)
Dept: PHYSICAL THERAPY | Age: 77
Setting detail: THERAPIES SERIES
Discharge: HOME | End: 2022-03-18
Payer: MEDICARE

## 2022-03-18 PROCEDURE — 97112 NEUROMUSCULAR REEDUCATION: CPT

## 2022-03-18 PROCEDURE — 97116 GAIT TRAINING THERAPY: CPT

## 2022-03-18 NOTE — PROGRESS NOTES
[id number] [address]  Outpatient Physical Therapy    Treatment Note        Date: 3/18/2022  Patient: Dante Hopper  : 1945  ACCT #: [de-identified]  Referring Practitioner: Dr. Asif Jefferson  Diagnosis: Ataxia  Treatment Diagnosis: Impaired mobility    Visit Information:  PT Visit Information  PT Insurance Information: Medicare  Total # of Visits to Date:   Plan of Care/Certification Expiration Date: 22  No Show: 0  Canceled Appointment: 1  Progress Note Counter:  (PN due 3/27/22)    Subjective: Pt reports took day off gym yesterday to rest.     HEP Compliance:  [x] Good [] Fair [] Poor [] Reports not doing due to:    Vital Signs  Patient Currently in Pain: No   Pain Screening  Patient Currently in Pain: No    OBJECTIVE:   Exercises  Exercise 3: NDT pivot on 4\" box with BUE support  Exercise 5: Gait ladder: F with emphasis on reciprocal stepping    Ambulation 1  Surface: carpet  Device: Single point cane  Assistance: Supervision  Quality of Gait: Progressed from 3 pt pattern to 2 pt pattern following gait ladder, improving posture, inconsistent LLE motor control and coordination  Distance: Short distances within clinic    Ambulation 2  Surface - 2: carpet  Device 2: Rolling Walker  Quality of Gait 2: Utilized bariatric walker with improved reciprocal stepping and upright posture  Distance: 100 ft    *Indicates exercise, modality, or manual techniques to be initiated when appropriate    Assessment: Body structures, Functions, Activity limitations: Decreased functional mobility ,Decreased strength,Decreased endurance,Decreased balance,Decreased coordination  Assessment: Pt brought in quintin walker from home with gait training to improve posture and reciprocal stepping with good carryover. Added gait ladder exercises to also progress reciprocal stepping with good carryover to gait out of clinic. Improving LLE motor control with addition of NDT pivots.   Treatment Diagnosis: Impaired mobility Goals:  Short term goals  Time Frame for Short term goals: 2 weeks  Short term goal 1: Independent with HEP    Long term goals  Time Frame for Long term goals : 6 weeks  Long term goal 1: Improve shanae LE strength to >/= 4+/5 to improve stability with standing and walking. Long term goal 2: Pt will ambulate with LRD >/= 200' with improved overall quality S/I. Long term goal 3: Sanchez >/= 40/56 to reduce pt's risk for falls. Long term goal 4: TUG with LRD </= 20 sec to improve safety with ambulation. Long term goal 5: Pt will be independent with transfers from all surfaces with minimal to no UE use. Long term goal 6: Pt will complete 1 curb step S/I with use of s/c with good safety. Progress toward goals: LTG1, LTG2, LTG3, LTG4, LTG5     POST-PAIN       Pain Rating (0-10 pain scale):   0/10   Location and pain description same as pre-treatment unless indicated. Action: [] NA   [x] Perform HEP  [] Meds as prescribed  [] Modalities as prescribed   [] Call Physician     Frequency/Duration:  Plan  Times per week: 2  Plan weeks: 6  Current Treatment Recommendations: Strengthening,ROM,Balance Training,Functional Mobility Training,Transfer Training,Gait Training,Stair training,Neuromuscular Re-education,Manual Therapy - Soft Tissue Mobilization,Home Exercise Program,Safety Education & Training,Patient/Caregiver Education & Training,Equipment Evaluation, Education, & procurement,Modalities,Integrated Dry Needling,Aquatics     Pt to continue current HEP. See objective section for any therapeutic exercise changes, additions or modifications this date.          PT Individual Minutes  Time In: 8694  Time Out: 4940  Minutes: 56  Timed Code Treatment Minutes: 56 Minutes  Procedure Minutes: 0     Timed Activity Minutes Units   Gait 46 3   Neuro  10 1       Signature:  Electronically signed by Trung Gay PTA on 3/18/22 at 4:22 PM EDT

## 2022-03-23 ENCOUNTER — HOSPITAL ENCOUNTER (OUTPATIENT)
Dept: PHYSICAL THERAPY | Age: 77
Setting detail: THERAPIES SERIES
Discharge: HOME | End: 2022-03-23
Payer: MEDICARE

## 2022-03-23 PROCEDURE — 97110 THERAPEUTIC EXERCISES: CPT

## 2022-03-23 PROCEDURE — 97116 GAIT TRAINING THERAPY: CPT

## 2022-03-25 ENCOUNTER — HOSPITAL ENCOUNTER (OUTPATIENT)
Dept: PHYSICAL THERAPY | Age: 77
Setting detail: THERAPIES SERIES
Discharge: HOME | End: 2022-03-25
Payer: MEDICARE

## 2022-03-25 PROCEDURE — 97112 NEUROMUSCULAR REEDUCATION: CPT

## 2022-03-25 PROCEDURE — 97116 GAIT TRAINING THERAPY: CPT

## 2022-03-25 PROCEDURE — 97110 THERAPEUTIC EXERCISES: CPT

## 2022-03-25 NOTE — PROGRESS NOTES
[address]     Ph: [phone]  Fax: [phone]    [] Certification  [] Recertification []  Plan of Care  [x] Progress Note [] Discharge      To:  Dr. Patricia Mary      From:  [facility] PT DPT  Patient: Delio Anderson     : 1945  Diagnosis: Ataxia     Date: 3/25/2022  Treatment Diagnosis: Impaired mobility    Plan of Care/Certification Expiration Date: 22  Progress Report Period from:  2022  to 3/25/2022    Total # of Visits to Date: 20   No Show: 0    Canceled Appointment: 1     OBJECTIVE:   Short Term Goals - Time Frame for Short term goals: 2 weeks    Goals Current/Discharge status  Met   Short term goal 1: Independent with HEP  Independent  with current HEP and going to gym 2x/wk [x] yes  [] no     Long Term Goals - Time Frame for Long term goals : 6 weeks  Goals Current/ Discharge status Met   Long term goal 1: Improve shanae LE strength to >/= 4+/5 to improve stability with standing and walking. Strength RLE  Strength RLE:  (Seated)  Comment: Hip flex 5/5, knee ext/flex 5/5, DF 5/5, hip abd 4+/5  Strength LLE  Comment: Hip flex 4+/ 5/5, knee ext/flex 5/5, DF 4+/5/5, hip abd 4/5   [x] yes  [x] no   Long term goal 2: Pt will ambulate with LRD >/= 200' with improved overall quality S/I. Ambulation 1  Surface: carpet,level tile  Device: Single point cane  Assistance: Supervision  Quality of Gait: Contiued gait training with 2point pattern using continous stepping pattern with increased fatigue  Gait Deviations: Slow Aria,Decreased step length,Decreased step height  Distance: 80ft  Ambulation 2  Surface - 2: carpet  Device 2: Rolling Walker  Assistance 2: Supervision,Stand by assistance  Quality of Gait 2:  Increased aria  Gait Deviations: Decreased step length,Decreased step height  Distance: 230FT  Ambulation  Ambulation?: Yes  Stairs  Curbs: 6\"  Device: Single pt cane (1 HR)  Assistance: Contact guard assistance  Comment: NR, unable to complete without handrail for support. [] yes  [] no   Long term goal 3: Sanchez >/= 40/56 to reduce pt's risk for falls. Sanchez = 36/56 [] yes  [x] no   Long term goal 4: TUG with LRD </= 20 sec to improve safety with ambulation. TUG = AVG score of 27 seconds [] yes  [x] no   Long term goal 5: Pt will be independent with transfers from all surfaces with minimal to no UE use. Pt transfers with minimal to no ue support safely [x] yes  [] no    Long term goal 6: Pt will complete 1 curb step S/I with use of s/c with good safety. Stairs  Curbs: 6\"  Device: Single pt cane (1 HR)  Assistance: Contact guard assistance  Comment: NR, unable to complete without handrail for support. [] yes  [x] no        Body structures, Functions, Activity limitations: Decreased functional mobility ,Decreased strength,Decreased endurance,Decreased balance,Decreased coordination  Assessment: Pt making slow progress towards goals. Sanchez improved 2 pts in the past month. Pt progressing towards TUG and gait quality with SC, however would like to continue to improve . Pt would benefit from continued therapy to progress gait quality and safety with mobility. Discharge Recommendations: Continue to assess pending progress      PLAN: [] Evaluate and Treat  Frequency/Duration:  Plan  Times per week: 2  Plan weeks: 6  Current Treatment Recommendations: Strengthening,ROM,Balance Training,Functional Mobility Training,Transfer Training,Gait Training,Stair training,Neuromuscular Re-education,Manual Therapy - Soft Tissue Mobilization,Home Exercise Program,Safety Education & Training,Patient/Caregiver Education & Training,Equipment Evaluation, Education, & procurement,Modalities,Integrated Dry Needling,Aquatics     Precautions:   falls                         Patient Status:[x] Continue/ Initiate plan of Care    [] Discharge PT. Recommend pt continue with HEP.      [] Additional visits requested, Please re-certify for additional visits:        Objective measurements provided by:Electronically signed by David Rod PTA on 3/25/22 at 4:29 PM EDT    Signature: Electronically signed by Chitra Villarreal PT on 3/31/2022 at 8:29 AM    If you have any questions or concerns, please don't hesitate to call.   Thank you for your referral.

## 2022-03-25 NOTE — PROGRESS NOTES
[id number] [address]  Outpatient Physical Therapy    Treatment Note        Date: 3/25/2022  Patient: eKmi Agosto  : 1945  ACCT #: [de-identified]  Referring Practitioner: Dr. Rowan Hancock  Diagnosis: Ataxia  Treatment Diagnosis: Impaired mobility    Visit Information:  PT Visit Information  PT Insurance Information: Medicare  Total # of Visits to Date:   Plan of Care/Certification Expiration Date: 22  No Show: 0  Progress Note Due Date: 22  Canceled Appointment: 1  Progress Note Counter:  (PN due 3/27/22)    Subjective: Pt reports he feels he is making progress and would like to continue gait trainig with cane and strength     HEP Compliance:  [x] Good [x] Fair [] Poor [] Reports not doing due to:    Vital Signs  Patient Currently in Pain: No   Pain Screening  Patient Currently in Pain: No    OBJECTIVE:   Exercises  Exercise 1: Curb step 6\" : see Amb. Exercise 2: Sanchez = 36, obj measures    Ambulation 1  Surface: carpet,level tile  Device: Single point cane  Assistance: Supervision  Quality of Gait: Contiued gait training with 2point pattern using continous stepping pattern with increased fatigue  Gait Deviations: Slow Varsha,Decreased step length,Decreased step height  Distance: 80ft    Ambulation 2  Surface - 2: carpet  Device 2: Rolling Walker  Assistance 2: Supervision,Stand by assistance  Quality of Gait 2: Increased varsha  Gait Deviations: Decreased step length,Decreased step height  Distance: 230FT    Stairs  Curbs: 6\"  Device: Single pt cane (1 HR)  Assistance: Contact guard assistance  Comment: NR, unable to complete without handrail for support.        Strength: [] NT  [x] MMT completed:  Strength RLE  Strength RLE:  (Seated)  Comment: Hip flex 5/5, knee ext/flex 5/5, DF 5/5, hip abd 4+/5  Strength LLE  Comment: Hip flex 4+/ 5/5, knee ext/flex 5/5, DF 4+/5/5, hip abd 4/5     *Indicates exercise, modality, or manual techniques to be initiated when appropriate    Assessment: Body structures, Functions, Activity limitations: Decreased functional mobility ,Decreased strength,Decreased endurance,Decreased balance,Decreased coordination  Assessment: Pt making slow progress towards goals. Sanchez improved 2 pts in the past month . Pt making Progress towards TUG and gait quality  with SC, however would like to continue to improve . Pt would benefit from continued therapy to progress gait quality . Treatment Diagnosis: Impaired mobility          Goals:  Short term goals  Time Frame for Short term goals: 2 weeks  Short term goal 1: Independent with HEP    Long term goals  Time Frame for Long term goals : 6 weeks  Long term goal 1: Improve shanae LE strength to >/= 4+/5 to improve stability with standing and walking. Long term goal 2: Pt will ambulate with LRD >/= 200' with improved overall quality S/I. Long term goal 3: Sanchez >/= 40/56 to reduce pt's risk for falls. Long term goal 4: TUG with LRD </= 20 sec to improve safety with ambulation. Long term goal 5: Pt will be independent with transfers from all surfaces with minimal to no UE use. Long term goal 6: Pt will complete 1 curb step S/I with use of s/c with good safety. Progress toward goals:Progress towards all goals  POST-PAIN       Pain Rating (0-10 pain scale):  0 /10   Location and pain description same as pre-treatment unless indicated. Action: [x] NA   [] Perform HEP  [] Meds as prescribed  [] Modalities as prescribed   [] Call Physician     Frequency/Duration:  Plan  Times per week: 2  Plan weeks: 6  Current Treatment Recommendations: Strengthening,ROM,Balance Training,Functional Mobility Training,Transfer Training,Gait Training,Stair training,Neuromuscular Re-education,Manual Therapy - Soft Tissue Mobilization,Home Exercise Program,Safety Education & Training,Patient/Caregiver Education & Training,Equipment Evaluation, Education, & procurement,Modalities,Integrated Dry Needling,Aquatics     Pt to continue current HEP.   See objective section for any therapeutic exercise changes, additions or modifications this date. PT Individual Minutes  Time In: 1501  Time Out: 1600  Minutes: 59  Timed Code Treatment Minutes: 59 Minutes  Procedure Minutes:0     Timed Activity Minutes Units   Ther Ex 15 1   Neuro 15 1   Gait 29 2       Signature:  Electronically signed by Kristel Cabral PTA on 3/25/22 at 4:27 PM EDT    Patient was seen for 8 minutes by PT to check on progress and reassess goals for 30 day progress note.   Electronically signed by Beth Mejia PT on 3/28/2022 at 9:41 AM

## 2022-03-29 ENCOUNTER — HOSPITAL ENCOUNTER (OUTPATIENT)
Dept: PHYSICAL THERAPY | Age: 77
Setting detail: THERAPIES SERIES
Discharge: HOME | End: 2022-03-29
Payer: MEDICARE

## 2022-03-29 PROCEDURE — 97116 GAIT TRAINING THERAPY: CPT

## 2022-03-29 PROCEDURE — 97112 NEUROMUSCULAR REEDUCATION: CPT

## 2022-03-29 NOTE — PROGRESS NOTES
ability to utilize family room. Discussed pt installing secure railing to increase safety for entering and exiting FR. Pt given information for Foot up to trial increased DF Lt foot. Treatment Diagnosis: Impaired mobility          Goals:  Short term goals  Time Frame for Short term goals: 2 weeks  Short term goal 1: Independent with HEP    Long term goals  Time Frame for Long term goals : 6 weeks  Long term goal 1: Improve shanae LE strength to >/= 4+/5 to improve stability with standing and walking. Long term goal 2: Pt will ambulate with LRD >/= 200' with improved overall quality S/I. Long term goal 3: Sanchez >/= 40/56 to reduce pt's risk for falls. Long term goal 4: TUG with LRD </= 20 sec to improve safety with ambulation. Long term goal 5: Pt will be independent with transfers from all surfaces with minimal to no UE use. Long term goal 6: Pt will complete 1 curb step S/I with use of s/c with good safety. Progress toward goals:gait, stepping    POST-PAIN       Pain Rating (0-10 pain scale):  0 /10   Location and pain description same as pre-treatment unless indicated. Action: [x] NA   [] Perform HEP  [] Meds as prescribed  [] Modalities as prescribed   [] Call Physician     Frequency/Duration:  Plan  Times per week: 2  Plan weeks: 6  Specific instructions for Next Treatment: outdoors on concrete with SC? Current Treatment Recommendations: Strengthening,ROM,Balance Training,Functional Mobility Training,Transfer Training,Gait Training,Stair training,Neuromuscular Re-education,Manual Therapy - Soft Tissue Mobilization,Home Exercise Program,Safety Education & Training,Patient/Caregiver Education & Training,Equipment Evaluation, Education, & procurement,Modalities,Integrated Dry Needling,Aquatics     Pt to continue current HEP. See objective section for any therapeutic exercise changes, additions or modifications this date.          PT Individual Minutes  Time In: 1402  Time Out: 1502  Minutes: 60  Timed Code Treatment Minutes: 60 Minutes  Procedure Minutes:0   Timed Activity Minutes Units   Neuro 30 2   GAIT 30 2       Signature:  Electronically signed by Karri Mariano PTA on 3/29/22 at 3:16 PM EDT

## 2022-04-01 ENCOUNTER — HOSPITAL ENCOUNTER (OUTPATIENT)
Dept: PHYSICAL THERAPY | Age: 77
Setting detail: THERAPIES SERIES
Discharge: HOME | End: 2022-04-01
Payer: MEDICARE

## 2022-04-01 PROCEDURE — 97116 GAIT TRAINING THERAPY: CPT

## 2022-04-01 NOTE — PROGRESS NOTES
[id number] [address]  Outpatient Physical Therapy    Treatment Note        Date: 2022  Patient: Sharad Mcgovern  : 1945  ACCT #: [de-identified]  Referring Practitioner: Dr. Leisa Cobb  Diagnosis: Ataxia  Treatment Diagnosis: Impaired mobility    Visit Information:  PT Visit Information  PT Insurance Information: Medicare  Total # of Visits to Date: 25  Plan of Care/Certification Expiration Date: 22  No Show: 0  Canceled Appointment: 1  Progress Note Counter: 10/12 (PN due 22)    Subjective: Pt reports would like to continue to improve gait efficiency. HEP Compliance:  [x] Good [] Fair [] Poor [] Reports not doing due to:    Vital Signs  Patient Currently in Pain: No   Pain Screening  Patient Currently in Pain: No    OBJECTIVE:   Exercises  Exercise 1: Curb step 6\" with // bars and str cane  Exercise 5: Gait ladder: F with emphasis on reciprocal stepping, over str canes and 6\" hurdles to improve foot clearance    Ambulation 1  Surface: carpet,level tile  Device: Single point cane  Other Apparatus:  (GTB DF wrap)  Assistance: Supervision  Quality of Gait: Improving reciprocal pattern though quick to fatigue, decreased step length with turns  Distance: focus on quality over distance    Ambulation 2  Surface - 2: carpet  Device 2: Rolling Walker  Assistance 2: Modified Independent  Quality of Gait 2: Improved reciprocal stepping with improved upright posture  Distance: 80 ft    *Indicates exercise, modality, or manual techniques to be initiated when appropriate    Assessment: Body structures, Functions, Activity limitations: Decreased functional mobility ,Decreased strength,Decreased endurance,Decreased balance,Decreased coordination  Assessment: Discussed placing 4\" step in front of 8\" step into living room at home to increase ease with pt in agreement. Pt also to install railing to improve safety. Trialed DF assist wrap with TB without significant change in foot clearance.  Continued focus on improving reciprocal stepping with fair carryover to gait with WW, though continued difficulty with str cane d/t decreased Lt stance time. Treatment Diagnosis: Impaired mobility        Goals:  Short term goals  Time Frame for Short term goals: 2 weeks  Short term goal 1: Independent with HEP    Long term goals  Time Frame for Long term goals : 6 weeks  Long term goal 1: Improve shanae LE strength to >/= 4+/5 to improve stability with standing and walking. Long term goal 2: Pt will ambulate with LRD >/= 200' with improved overall quality S/I. Long term goal 3: Sanchez >/= 40/56 to reduce pt's risk for falls. Long term goal 4: TUG with LRD </= 20 sec to improve safety with ambulation. Long term goal 5: Pt will be independent with transfers from all surfaces with minimal to no UE use. Long term goal 6: Pt will complete 1 curb step S/I with use of s/c with good safety. Progress toward goals: LTG1, LTG2, LTG3, LTG4, LTG5, LTG6    POST-PAIN       Pain Rating (0-10 pain scale):   0/10   Location and pain description same as pre-treatment unless indicated. Action: [] NA   [x] Perform HEP  [] Meds as prescribed  [] Modalities as prescribed   [] Call Physician     Frequency/Duration:  Plan  Times per week: 2  Plan weeks: 6  Specific instructions for Next Treatment: outdoors on concrete with SC? Current Treatment Recommendations: Strengthening,ROM,Balance Training,Functional Mobility Training,Transfer Training,Gait Training,Stair training,Neuromuscular Re-education,Manual Therapy - Soft Tissue Mobilization,Home Exercise Program,Safety Education & Training,Patient/Caregiver Education & Training,Equipment Evaluation, Education, & procurement,Modalities,Integrated Dry Needling,Aquatics     Pt to continue current HEP. See objective section for any therapeutic exercise changes, additions or modifications this date.          PT Individual Minutes  Time In: 1503  Time Out: 3209  Minutes: 54  Timed Code Treatment Minutes: 47 Minutes  Procedure Minutes: 0     Timed Activity Minutes Units   Gait 54 4       Signature:  Electronically signed by Arpit Jamil PTA on 4/1/22 at 4:11 PM EDT

## 2022-04-06 ENCOUNTER — HOSPITAL ENCOUNTER (OUTPATIENT)
Dept: PHYSICAL THERAPY | Age: 77
Setting detail: THERAPIES SERIES
Discharge: HOME | End: 2022-04-06
Payer: MEDICARE

## 2022-04-06 PROCEDURE — 97116 GAIT TRAINING THERAPY: CPT

## 2022-04-06 NOTE — PROGRESS NOTES
[address] [address]  Outpatient Physical Therapy    Treatment Note        Date: 2022  Patient: Jamar Reno  : 1945  ACCT #: [de-identified]  Referring Practitioner: Dr. Soraya Corbett  Diagnosis: Ataxia  Treatment Diagnosis: Impaired mobility    Visit Information:  PT Visit Information  PT Insurance Information: Medicare  Total # of Visits to Date:   Plan of Care/Certification Expiration Date: 22  No Show: 0  Canceled Appointment: 1  Progress Note Counter:  (PN due 22)    Subjective: Pt reports went [place] casino yesterday with no issues with increased gait distance. HEP Compliance:  [x] Good [] Fair [] Poor [] Reports not doing due to:    Vital Signs  Patient Currently in Pain: No   Pain Screening  Patient Currently in Pain: No    OBJECTIVE:   Exercises  Exercise 5: Gait ladder: F with emphasis on reciprocal stepping with str cane and LUE support x3 laps  Exercise 7: 6\" hurdles F/ L in // bars with 1-2 UE support x4 laps ea    Ambulation 1  Surface: carpet  Device: Single point cane  Assistance: Supervision  Quality of Gait: Good sequencing with improving fluidity  Distance: 115 ft    Timed Up and Go: 26.9 (str cane; second trial 24.9)    *Indicates exercise, modality, or manual techniques to be initiated when appropriate    Assessment: Body structures, Functions, Activity limitations: Decreased functional mobility ,Decreased strength,Decreased endurance,Decreased balance,Decreased coordination  Assessment: Pt continues to be limited by endurance, requiring rest breaks between all activities. Demonstrates impoving LLE motor control with stepping over hurdles, though fatigues quickly. Pt with overall improved foot clearance and consistency of gait with al devices. No significant change in TUG though with improved efficiency in turn.   Treatment Diagnosis: Impaired mobility        Goals:  Short term goals  Time Frame for Short term goals: 2 weeks  Short term goal 1: Independent with HEP    Long term goals  Time Frame for Long term goals : 6 weeks  Long term goal 1: Improve shanae LE strength to >/= 4+/5 to improve stability with standing and walking. Long term goal 2: Pt will ambulate with LRD >/= 200' with improved overall quality S/I. Long term goal 3: Sanchez >/= 40/56 to reduce pt's risk for falls. Long term goal 4: TUG with LRD </= 20 sec to improve safety with ambulation. Long term goal 5: Pt will be independent with transfers from all surfaces with minimal to no UE use. Long term goal 6: Pt will complete 1 curb step S/I with use of s/c with good safety. Progress toward goals:  LTG1, LTG2, LTG3, LTG4, LTG5    POST-PAIN       Pain Rating (0-10 pain scale):   0/10   Location and pain description same as pre-treatment unless indicated. Action: [] NA   [x] Perform HEP  [] Meds as prescribed  [] Modalities as prescribed   [] Call Physician     Frequency/Duration:  Plan  Times per week: 2  Plan weeks: 6  Current Treatment Recommendations: Strengthening,ROM,Balance Training,Functional Mobility Training,Transfer Training,Gait Training,Stair training,Neuromuscular Re-education,Manual Therapy - Soft Tissue Mobilization,Home Exercise Program,Safety Education & Training,Patient/Caregiver Education & Training,Equipment Evaluation, Education, & procurement,Modalities,Integrated Dry Needling,Aquatics     Pt to continue current HEP. See objective section for any therapeutic exercise changes, additions or modifications this date.          PT Individual Minutes  Time In: 1500  Time Out: 6548  Minutes: 55  Timed Code Treatment Minutes: 55 Minutes  Procedure Minutes: 0     Timed Activity Minutes Units   Gait 55 4       Signature:  Electronically signed by Valeriy Goddard PTA on 4/6/22 at 3:27 PM EDT

## 2022-04-08 ENCOUNTER — HOSPITAL ENCOUNTER (OUTPATIENT)
Dept: PHYSICAL THERAPY | Age: 77
Setting detail: THERAPIES SERIES
Discharge: HOME | End: 2022-04-08
Payer: MEDICARE

## 2022-04-08 PROCEDURE — 97112 NEUROMUSCULAR REEDUCATION: CPT

## 2022-04-08 PROCEDURE — 97116 GAIT TRAINING THERAPY: CPT

## 2022-04-08 NOTE — PROGRESS NOTES
[address]     Ph: [phone]  Fax: [phone]    [] Certification  [] Recertification []  Plan of Care  [] Progress Note [x] Discharge      To:  Dr. Tasha Holt      From:  Ramon Foreman, PT  Patient: Xochilt Ragsdale     : 1945  Diagnosis: Ataxia     Date: 2022  Treatment Diagnosis: Impaired mobility    Plan of Care/Certification Expiration Date: 22  Progress Report Period from:  3/25/2022  to 2022    Total # of Visits to Date: 24   No Show: 0    Canceled Appointment: 1     OBJECTIVE:   Short Term Goals - Time Frame for Short term goals: 2 weeks    Goals Current/Discharge status  Met   Short term goal 1: Independent with HEP  Independent  [x] yes  [] no     Long Term Goals - Time Frame for Long term goals : 6 weeks  Goals Current/ Discharge status Met   Long term goal 1: Improve shanae LE strength to >/= 4+/5 to improve stability with standing and walking. Strength RLE  Comment: Hip flex 5/5, knee ext/flex 5/5, DF 5/5, hip abd 4+/5  Strength LLE  Comment: Hip flex 5/5, knee ext/flex 5/5, DF 5/5, hip abd 4/5   [x] yes  [x] no   Long term goal 2: Pt will ambulate with LRD >/= 200' with improved overall quality S/I. Ambulation 1  Surface: carpet  Device: Single point cane  Assistance: Supervision  Quality of Gait: Good sequencing with improving fluidity, decreased Lt foot clearance as Lt LE fatigues  Gait Deviations: Slow Aria,Decreased step length,Decreased step height  Distance: 115', 50' [x] yes  [x] no   Long term goal 3: Sanchez >/= 40/56 to reduce pt's risk for falls. Sanchez Balance Score: 37 [] yes  [x] no   Long term goal 4: TUG with LRD </= 20 sec to improve safety with ambulation. Timed Up and Go: 23.93 (with s/c) [] yes  [x] no   Long term goal 5: Pt will be independent with transfers from all surfaces with minimal to no UE use.  Independent with minimal to no UE use [x] yes  [] no Long term goal 6: Pt will complete 1 curb step S/I with use of s/c with good safety. Curb step 6\" with // bars and str cane [] yes  [x] no        Body structures, Functions, Activity limitations: Decreased functional mobility ,Decreased strength,Decreased endurance,Decreased balance,Decreased coordination  Assessment: Pt with improved overall strength since beginning PT. Pt able to ambulate with a s/c with improving fluidity and stamina. Pt requires seated RBs throughout due to ongoing fatigue and SOB with activity. Pt continues to be at an increased risk for falls but has demonstrates improved balance since beginning PT. Pt will be D/C'd at this time to HEP and continue improving strength while working out at gym. Prognosis: Good      PLAN: [] Evaluate and Treat  Frequency/Duration:  Plan  Plan Comment: D/C to HEP     Precautions:  falls                          Patient Status:[] Continue/ Initiate plan of Care    [x] Discharge PT. Recommend pt continue with HEP. [] Additional visits requested, Please re-certify for additional visits:          Signature: Electronically signed by Steve Hooker PT on 4/8/22 at 2:59 PM EDT      If you have any questions or concerns, please don't hesitate to call.   Thank you for your referral.

## 2022-04-08 NOTE — PROGRESS NOTES
[address] [address]  Outpatient Physical Therapy    Treatment Note        Date: 2022  Patient: Jamar Reno  : 1945  ACCT #: [de-identified]  Referring Practitioner: Dr. Soraya Corbett  Diagnosis: Ataxia  Treatment Diagnosis: Impaired mobility    Visit Information:  PT Visit Information  PT Insurance Information: Medicare  Total # of Visits to Date:   Plan of Care/Certification Expiration Date: 22  No Show: 0  Progress Note Due Date: 22  Canceled Appointment: 1  Progress Note Counter:  (PN due 22)    Subjective: Pt reports feeling he is 200% better than when he began PT. Pt is attending the gym twice a week and feels he can continue on his own at home. HEP Compliance:  [x] Good [] Fair [] Poor [] Reports not doing due to:    Vital Signs  Patient Currently in Pain: No   Pain Screening  Patient Currently in Pain: No    OBJECTIVE:   Ambulation 1  Surface: carpet  Device: Single point cane  Assistance: Supervision  Quality of Gait: Good sequencing with improving fluidity, decreased Lt foot clearance as Lt LE fatigues  Gait Deviations: Slow Aria,Decreased step length,Decreased step height  Distance: 115', 50'    Sanchez Balance Score: 37    Timed Up and Go: 23.93 (with s/c)    Strength: [] NT  [x] MMT completed:  Strength RLE  Comment: Hip flex 5/5, knee ext/flex 5/5, DF 5/5, hip abd 4+/5  Strength LLE  Comment: Hip flex 5/5, knee ext/flex 5/5, DF 5/5, hip abd 4/5    Assessment:   Activity Tolerance  Activity Tolerance: Patient Tolerated treatment well    Body structures, Functions, Activity limitations: Decreased functional mobility ,Decreased strength,Decreased endurance,Decreased balance,Decreased coordination  Assessment: Pt with improved overall strength since beginning PT. Pt able to ambulate with a s/c with improving fluidity and stamina. Pt requires seated RBs throughout due to ongoing fatigue and SOB with activity.   Pt continues to be at an increased risk for falls but has demonstrates improved balance since beginning PT. Pt will be D/C'd at this time to HEP and continue improving strength while working out at gym. Treatment Diagnosis: Impaired mobility  Prognosis: Good       Goals:  Short term goals  Time Frame for Short term goals: 2 weeks  Short term goal 1: Independent with HEP    Long term goals  Time Frame for Long term goals : 6 weeks  Long term goal 1: Improve shanae LE strength to >/= 4+/5 to improve stability with standing and walking. Long term goal 2: Pt will ambulate with LRD >/= 200' with improved overall quality S/I. Long term goal 3: Sanchez >/= 40/56 to reduce pt's risk for falls. Long term goal 4: TUG with LRD </= 20 sec to improve safety with ambulation. Long term goal 5: Pt will be independent with transfers from all surfaces with minimal to no UE use. Long term goal 6: Pt will complete 1 curb step S/I with use of s/c with good safety. Progress toward goals: see D/C note    POST-PAIN       Pain Rating (0-10 pain scale):  0 /10   Location and pain description same as pre-treatment unless indicated. Action: [x] NA   [] Perform HEP  [] Meds as prescribed  [] Modalities as prescribed   [] Call Physician     Frequency/Duration:  Plan  Plan Comment: D/C to HEP     Pt to continue current HEP. See objective section for any therapeutic exercise changes, additions or modifications this date.     PT Individual Minutes  Time In: 0374  Time Out: 1600  Minutes: 56  Timed Code Treatment Minutes: 54 Minutes  Procedure Minutes:0     Timed Activity Minutes Units   Neuro jamee 34 3   Gait 20 1       Signature:  Electronically signed by Inder Winchester PT on 4/8/22 at 2:59 PM EDT

## 2022-07-14 RX ORDER — ATORVASTATIN CALCIUM 40 MG/1
40 TABLET, FILM COATED ORAL NIGHTLY
Qty: 90 TABLET | Refills: 1 | Status: SHIPPED | OUTPATIENT
Start: 2022-07-14

## 2022-07-18 ENCOUNTER — TELEPHONE (OUTPATIENT)
Dept: PRIMARY CARE CLINIC | Age: 77
End: 2022-07-18

## 2022-09-12 ENCOUNTER — HOSPITAL ENCOUNTER (OUTPATIENT)
Dept: ULTRASOUND IMAGING | Age: 77
Discharge: HOME | End: 2022-09-14
Payer: MEDICARE

## 2022-09-12 DIAGNOSIS — N18.4 CHRONIC KIDNEY DISEASE, STAGE IV (SEVERE) (HCC): ICD-10-CM

## 2022-09-12 PROCEDURE — 76775 US EXAM ABDO BACK WALL LIM: CPT

## 2022-10-07 ENCOUNTER — TELEMEDICINE (OUTPATIENT)
Dept: PRIMARY CARE CLINIC | Age: 77
End: 2022-10-07
Payer: MEDICARE

## 2022-10-07 DIAGNOSIS — Z00.00 MEDICARE ANNUAL WELLNESS VISIT, SUBSEQUENT: Primary | ICD-10-CM

## 2022-10-07 PROCEDURE — 1123F ACP DISCUSS/DSCN MKR DOCD: CPT | Performed by: NURSE PRACTITIONER

## 2022-10-07 PROCEDURE — G0439 PPPS, SUBSEQ VISIT: HCPCS | Performed by: NURSE PRACTITIONER

## 2022-10-07 PROCEDURE — G8484 FLU IMMUNIZE NO ADMIN: HCPCS | Performed by: NURSE PRACTITIONER

## 2022-10-07 RX ORDER — CARVEDILOL 6.25 MG/1
1 TABLET ORAL 2 TIMES DAILY
COMMUNITY
Start: 2022-10-03

## 2022-10-07 SDOH — ECONOMIC STABILITY: FOOD INSECURITY: WITHIN THE PAST 12 MONTHS, YOU WORRIED THAT YOUR FOOD WOULD RUN OUT BEFORE YOU GOT MONEY TO BUY MORE.: NEVER TRUE

## 2022-10-07 SDOH — ECONOMIC STABILITY: FOOD INSECURITY: WITHIN THE PAST 12 MONTHS, THE FOOD YOU BOUGHT JUST DIDN'T LAST AND YOU DIDN'T HAVE MONEY TO GET MORE.: NEVER TRUE

## 2022-10-07 ASSESSMENT — SOCIAL DETERMINANTS OF HEALTH (SDOH): HOW HARD IS IT FOR YOU TO PAY FOR THE VERY BASICS LIKE FOOD, HOUSING, MEDICAL CARE, AND HEATING?: NOT HARD AT ALL

## 2022-10-07 ASSESSMENT — LIFESTYLE VARIABLES: HOW OFTEN DO YOU HAVE A DRINK CONTAINING ALCOHOL: NEVER

## 2022-10-07 ASSESSMENT — PATIENT HEALTH QUESTIONNAIRE - PHQ9
SUM OF ALL RESPONSES TO PHQ QUESTIONS 1-9: 0
2. FEELING DOWN, DEPRESSED OR HOPELESS: 0
SUM OF ALL RESPONSES TO PHQ QUESTIONS 1-9: 0
SUM OF ALL RESPONSES TO PHQ9 QUESTIONS 1 & 2: 0
SUM OF ALL RESPONSES TO PHQ QUESTIONS 1-9: 0
1. LITTLE INTEREST OR PLEASURE IN DOING THINGS: 0
SUM OF ALL RESPONSES TO PHQ QUESTIONS 1-9: 0

## 2022-10-07 NOTE — PATIENT INSTRUCTIONS
Personalized Preventive Plan for Sterling Mathew - 10/7/2022  Medicare offers a range of preventive health benefits. Some of the tests and screenings are paid in full while other may be subject to a deductible, co-insurance, and/or copay. Some of these benefits include a comprehensive review of your medical history including lifestyle, illnesses that may run in your family, and various assessments and screenings as appropriate. After reviewing your medical record and screening and assessments performed today your provider may have ordered immunizations, labs, imaging, and/or referrals for you. A list of these orders (if applicable) as well as your Preventive Care list are included within your After Visit Summary for your review. Other Preventive Recommendations:    A preventive eye exam performed by an eye specialist is recommended every 1-2 years to screen for glaucoma; cataracts, macular degeneration, and other eye disorders. A preventive dental visit is recommended every 6 months. Try to get at least 150 minutes of exercise per week or 10,000 steps per day on a pedometer . Order or download the FREE \"Exercise & Physical Activity: Your Everyday Guide\" from [employer]. Call 8-393.206.9752 or search [employer] online. You need 7792-5219 mg of calcium and 3573-4609 IU of vitamin D per day. It is possible to meet your calcium requirement with diet alone, but a vitamin D supplement is usually necessary to meet this goal.  When exposed to the sun, use a sunscreen that protects against both UVA and UVB radiation with an SPF of 30 or greater. Reapply every 2 to 3 hours or after sweating, drying off with a towel, or swimming. Always wear a seat belt when traveling in a car. Always wear a helmet when riding a bicycle or motorcycle.

## 2022-10-07 NOTE — PROGRESS NOTES
Medicare Annual Wellness Visit    Mali Ramsey is here for Medicare AWV    Assessment & Plan   Medicare annual wellness visit, subsequent    Recommendations for Preventive Services Due: see orders and patient instructions/AVS.  Recommended screening schedule for the next 5-10 years is provided to the patient in written form: see Patient Instructions/AVS.     Return for Medicare Annual Wellness Visit in 1 year. Subjective       Patient's complete Health Risk Assessment and screening values have been reviewed and are found in Flowsheets. The following problems were reviewed today and where indicated follow up appointments were made and/or referrals ordered. Positive Risk Factor Screenings with Interventions:    Fall Risk:  Do you feel unsteady or are you worried about falling? : (!) yes (Uses a walker)  2 or more falls in past year?: no  Fall with injury in past year?: no   Fall Risk Interventions:    Patient declines any further evaluation/treatment for this issue            General Health and ACP:  General  In general, how would you say your health is?: Fair  In the past 7 days, have you experienced any of the following: New or Increased Pain, New or Increased Fatigue, Loneliness, Social Isolation, Stress or Anger?: No  Do you get the social and emotional support that you need?: Yes  Do you have a Living Will?: Yes    Advance Directives       Power of  Living Will ACP-Advance Directive ACP-Power of     Not on File Not on File Not on File Not on File          General Health Risk Interventions:  No concerns at this time.     Health Habits/Nutrition:  Physical Activity: Insufficiently Active    Days of Exercise per Week: 7 days    Minutes of Exercise per Session: 20 min           Have you seen the dentist within the past year?: (!) No  Health Habits/Nutrition Interventions:  Inadequate physical activity:  patient is not ready to increase his/her physical activity level at this time  Dental exam overdue:  patient encouraged to make appointment with his/her dentist    Hearing/Vision:  Do you or your family notice any trouble with your hearing that hasn't been managed with hearing aids?: No  Do you have difficulty driving, watching TV, or doing any of your daily activities because of your eyesight?: No  Have you had an eye exam within the past year?: (!) No  No results found. Hearing/Vision Interventions:  No concerns at this time but encouraged to make an appointment. ADLs:  In the past 7 days, did you need help from others to perform any of the following everyday activities: Eating, dressing, grooming, bathing, toileting, or walking/balance?: (!) Yes  Select all that apply: (!) Bathing  In the past 7 days, did you need help from others to take care of any of the following: Laundry, housekeeping, banking/finances, shopping, telephone use, food preparation, transportation, or taking medications?: (!) Yes  Select all that apply: [employer], Food Preparation, Laundry, Housekeeping  ADL Interventions:  Patient declines any further evaluation/treatment for this issue  Wife is able to assist patient as needed. Objective      Patient-Reported Vitals  No data recorded          No Known Allergies  Prior to Visit Medications    Medication Sig Taking?  Authorizing Provider   carvedilol (COREG) 6.25 MG tablet Take 1 tablet by mouth in the morning and at bedtime  Historical Provider, MD   atorvastatin (LIPITOR) 40 MG tablet Take 1 tablet by mouth nightly  Marley Hoyt MD   torsemide (DEMADEX) 20 MG tablet Take 1 tablet by mouth 2 times daily  Marley Hoyt MD   allopurinol (ZYLOPRIM) 300 MG tablet Take 1 tablet by mouth daily  Marley Hoyt MD   minoxidil (LONITEN) 2.5 MG tablet Take 1 tablet by mouth daily  Marley Hoyt MD   amLODIPine (NORVASC) 10 MG tablet Take 1 tablet by mouth daily  Marley Hoyt MD   aspirin 81 MG tablet Take 1 tablet by mouth daily  MD Ivan Beatty (Including outside providers/suppliers regularly involved in providing care):   Patient Care Team:  Aline Arnold MD as PCP - Roberta Louis MD as PCP - [facility] Empaneled Provider     Reviewed and updated this visit:  Tobacco  Allergies  Meds  Med Hx  Surg Hx  Soc Hx  Fam Hx          Geronimo Marie, was evaluated through a synchronous (real-time) audio-video encounter. The patient (or guardian if applicable) is aware that this is a billable service, which includes applicable co-pays. This Virtual Visit was conducted with patient's (and/or legal guardian's) consent. The visit was conducted pursuant to the emergency declaration under the [address] [place] [address] authority and the [employer] Act. Patient identification was verified, and a caregiver was present when appropriate. The patient was located at Home: [facility] [address]. Provider was located at [facility] (Appt Dept): [address] [place] [address].

## 2023-05-15 ENCOUNTER — HOSPITAL ENCOUNTER (OUTPATIENT)
Dept: DATA CONVERSION | Facility: HOSPITAL | Age: 78
End: 2023-05-15
Attending: SURGERY | Admitting: SURGERY
Payer: MEDICARE

## 2023-05-15 DIAGNOSIS — Z96.653 PRESENCE OF ARTIFICIAL KNEE JOINT, BILATERAL: ICD-10-CM

## 2023-05-15 DIAGNOSIS — I69.354 HEMIPLEGIA AND HEMIPARESIS FOLLOWING CEREBRAL INFARCTION AFFECTING LEFT NON-DOMINANT SIDE (MULTI): ICD-10-CM

## 2023-05-15 DIAGNOSIS — N18.6 END STAGE RENAL DISEASE (MULTI): ICD-10-CM

## 2023-05-15 DIAGNOSIS — Z99.2 DEPENDENCE ON RENAL DIALYSIS (CMS-HCC): ICD-10-CM

## 2023-05-15 LAB
ANION GAP IN SER/PLAS: 16 MMOL/L (ref 10–20)
CALCIUM (MG/DL) IN SER/PLAS: 8.6 MG/DL (ref 8.6–10.3)
CARBON DIOXIDE, TOTAL (MMOL/L) IN SER/PLAS: 26 MMOL/L (ref 21–32)
CHLORIDE (MMOL/L) IN SER/PLAS: 101 MMOL/L (ref 98–107)
CREATININE (MG/DL) IN SER/PLAS: 5.84 MG/DL (ref 0.5–1.3)
GFR MALE: 9 ML/MIN/1.73M2
GLUCOSE (MG/DL) IN SER/PLAS: 85 MG/DL (ref 74–99)
POTASSIUM (MMOL/L) IN SER/PLAS: 4.4 MMOL/L (ref 3.5–5.3)
SODIUM (MMOL/L) IN SER/PLAS: 139 MMOL/L (ref 136–145)
UREA NITROGEN (MG/DL) IN SER/PLAS: 70 MG/DL (ref 6–23)

## 2023-05-18 LAB
ATRIAL RATE: 258 BPM
Q ONSET: 190 MS
QRS COUNT: 12 BEATS
QRS DURATION: 146 MS
QT INTERVAL: 422 MS
QTC CALCULATION(BAZETT): 462 MS
QTC FREDERICIA: 448 MS
R AXIS: -12 DEGREES
T AXIS: 8 DEGREES
T OFFSET: 401 MS
VENTRICULAR RATE: 72 BPM

## 2023-09-14 NOTE — H&P
History & Physical Reviewed:   I have reviewed the History and Physical dated:  15-May-2023   History and Physical reviewed and relevant findings noted. Patient examined to review pertinent physical  findings.: No significant changes   Home Medications Reviewed: no changes noted   Allergies Reviewed: no changes noted     Consent:   COVID-19 Consent:  ·  COVID-19 Risk Consent Surgeon has reviewed key risks related to the risk of zita COVID-19 and if they contract COVID-19 what the risks are.       Electronic Signatures:  Pedro Lorenz)  (Signed 15-May-2023 09:57)   Authored: History & Physical Reviewed, Consent, Note  Completion      Last Updated: 15-May-2023 09:57 by Pedro Lorenz)

## 2023-09-27 ENCOUNTER — HOSPITAL ENCOUNTER (OUTPATIENT)
Dept: DATA CONVERSION | Facility: HOSPITAL | Age: 78
End: 2023-09-27
Attending: STUDENT IN AN ORGANIZED HEALTH CARE EDUCATION/TRAINING PROGRAM | Admitting: STUDENT IN AN ORGANIZED HEALTH CARE EDUCATION/TRAINING PROGRAM
Payer: MEDICARE

## 2023-09-27 DIAGNOSIS — N18.6 END STAGE RENAL DISEASE (MULTI): ICD-10-CM

## 2023-09-27 DIAGNOSIS — T82.510A: ICD-10-CM

## 2023-09-27 DIAGNOSIS — T82.858A STENOSIS OF OTHER VASCULAR PROSTHETIC DEVICES, IMPLANTS AND GRAFTS, INITIAL ENCOUNTER (CMS-HCC): ICD-10-CM

## 2023-10-02 NOTE — OP NOTE
Post Operative Note:     PreOp Diagnosis: ESRD   Post-Procedure Diagnosis: same   Procedure: 1. Left brachiocephalic AVF creation with  elevation   Surgeon: Gerda   Resident/Fellow/Other Assistant: Po   Anesthesia: regional/mac   Estimated Blood Loss (mL): minimal   Specimen: no   Findings: strong thrill at the conclusion and palpable  radial pulse     Operative Report Dictated:  Dictation: not applicable - note contains Operative  Report   Operative Report:    Indications: This patient has ESRD and is already on HD via R TDC. He requires permanent dialysis access.  Procedure details: Informed consent was obtained. An arm block was performed in the pre-operative area. The patient was brought to the OR and placed supine on the table. MAC was administered. The left arm was abducted and ultrasound was used to interrogate  the cephalic vein from the shoulder to the wrist. It was >3mm to the mid forearm and then became too small to use for a fistula. The arm was prepped and draped in sterlie fashion. The vein appeared deep on ultrasound and somewhat lateral, so I exposed  it through a longitudinal incision above the antecubital fossa.  The incision was deepened with cautery and the cephalic vein was identified just at the confluence with the median cubital vein. Small side branches were ligated with silk suture. The incision  was extended up the arm a few cm and the vein was completely freed up in this area. I then made a second incision more medial to this and exposed the brachial artery and controlled it with a loop. The vein was transected distally and flushed with heparin  saline. It was then marked on its anterior surface and pulled through a shallow tunnel using a tonsil clamp.  Heparin was administered. The brachial artery was clamped proximally and distally. An arteriotomy was made and a 4mm aortic punch was used to  create a 4mm oval arteriotomy. The end of the vein was sewn to the side of the artery using  running 6-0 prolene suture. The anastomosis was flushed and de-aired and then completed. The vein outflow was unclamped followed by the brachial artery clamp.  After a few cardiac cycles the distal artery clamp was released as well. There was an excellent thrill in the fistula. Protamine was administered and hemostasis was achieved. The incisions were closed with interrupted 3-0 vicryl for the deep dermal layer  and 4-0 monocryl for the skin. Sterile dressings were applied and the patient was taken to the recovery area in good condition.     Attestation:   Note Completion:  Attending Attestation I was present for the entire procedure         Electronic Signatures:  Pedro Lorenz)  (Signed 15-May-2023 12:45)   Authored: Post Operative Note, Note Completion      Last Updated: 15-May-2023 12:45 by Pedro Lorenz)

## 2024-01-04 ENCOUNTER — APPOINTMENT (OUTPATIENT)
Dept: VASCULAR SURGERY | Facility: CLINIC | Age: 79
End: 2024-01-04
Payer: MEDICARE